# Patient Record
Sex: FEMALE | Race: WHITE | NOT HISPANIC OR LATINO | Employment: FULL TIME | RURAL
[De-identification: names, ages, dates, MRNs, and addresses within clinical notes are randomized per-mention and may not be internally consistent; named-entity substitution may affect disease eponyms.]

---

## 2018-08-15 ENCOUNTER — OFFICE VISIT (OUTPATIENT)
Dept: FAMILY MEDICINE CLINIC | Facility: CLINIC | Age: 38
End: 2018-08-15

## 2018-08-15 VITALS
DIASTOLIC BLOOD PRESSURE: 80 MMHG | BODY MASS INDEX: 33.4 KG/M2 | HEART RATE: 107 BPM | HEIGHT: 66 IN | RESPIRATION RATE: 20 BRPM | WEIGHT: 207.8 LBS | SYSTOLIC BLOOD PRESSURE: 120 MMHG | TEMPERATURE: 98.3 F | OXYGEN SATURATION: 97 %

## 2018-08-15 DIAGNOSIS — Z13.1 SCREENING FOR DIABETES MELLITUS: ICD-10-CM

## 2018-08-15 DIAGNOSIS — M25.511 CHRONIC RIGHT SHOULDER PAIN: Primary | ICD-10-CM

## 2018-08-15 DIAGNOSIS — G89.29 CHRONIC RIGHT SHOULDER PAIN: Primary | ICD-10-CM

## 2018-08-15 DIAGNOSIS — M79.672 LEFT FOOT PAIN: ICD-10-CM

## 2018-08-15 DIAGNOSIS — Z13.29 SCREENING FOR THYROID DISORDER: ICD-10-CM

## 2018-08-15 DIAGNOSIS — Z13.220 SCREENING FOR LIPOID DISORDERS: ICD-10-CM

## 2018-08-15 PROCEDURE — 99214 OFFICE O/P EST MOD 30 MIN: CPT | Performed by: NURSE PRACTITIONER

## 2018-08-15 RX ORDER — TETRACYCLINE HCL 500 MG
1 CAPSULE ORAL 3 TIMES DAILY
COMMUNITY
End: 2020-10-28

## 2018-08-15 NOTE — PATIENT INSTRUCTIONS
Preventive Care 18-39 Years, Female  Preventive care refers to lifestyle choices and visits with your health care provider that can promote health and wellness.  What does preventive care include?  · A yearly physical exam. This is also called an annual well check.  · Dental exams once or twice a year.  · Routine eye exams. Ask your health care provider how often you should have your eyes checked.  · Personal lifestyle choices, including:  ? Daily care of your teeth and gums.  ? Regular physical activity.  ? Eating a healthy diet.  ? Avoiding tobacco and drug use.  ? Limiting alcohol use.  ? Practicing safe sex.  ? Taking vitamin and mineral supplements as recommended by your health care provider.  What happens during an annual well check?  The services and screenings done by your health care provider during your annual well check will depend on your age, overall health, lifestyle risk factors, and family history of disease.  Counseling  Your health care provider may ask you questions about your:  · Alcohol use.  · Tobacco use.  · Drug use.  · Emotional well-being.  · Home and relationship well-being.  · Sexual activity.  · Eating habits.  · Work and work environment.  · Method of birth control.  · Menstrual cycle.  · Pregnancy history.    Screening  You may have the following tests or measurements:  · Height, weight, and BMI.  · Diabetes screening. This is done by checking your blood sugar (glucose) after you have not eaten for a while (fasting).  · Blood pressure.  · Lipid and cholesterol levels. These may be checked every 5 years starting at age 20.  · Skin check.  · Hepatitis C blood test.  · Hepatitis B blood test.  · Sexually transmitted disease (STD) testing.  · BRCA-related cancer screening. This may be done if you have a family history of breast, ovarian, tubal, or peritoneal cancers.  · Pelvic exam and Pap test. This may be done every 3 years starting at age 21. Starting at age 30, this may be done every 5  years if you have a Pap test in combination with an HPV test.    Discuss your test results, treatment options, and if necessary, the need for more tests with your health care provider.  Vaccines  Your health care provider may recommend certain vaccines, such as:  · Influenza vaccine. This is recommended every year.  · Tetanus, diphtheria, and acellular pertussis (Tdap, Td) vaccine. You may need a Td booster every 10 years.  · Varicella vaccine. You may need this if you have not been vaccinated.  · HPV vaccine. If you are 26 or younger, you may need three doses over 6 months.  · Measles, mumps, and rubella (MMR) vaccine. You may need at least one dose of MMR. You may also need a second dose.  · Pneumococcal 13-valent conjugate (PCV13) vaccine. You may need this if you have certain conditions and were not previously vaccinated.  · Pneumococcal polysaccharide (PPSV23) vaccine. You may need one or two doses if you smoke cigarettes or if you have certain conditions.  · Meningococcal vaccine. One dose is recommended if you are age 19-21 years and a first-year college student living in a [place], or if you have one of several medical conditions. You may also need additional booster doses.  · Hepatitis A vaccine. You may need this if you have certain conditions or if you travel or work in places where you may be exposed to hepatitis A.  · Hepatitis B vaccine. You may need this if you have certain conditions or if you travel or work in places where you may be exposed to hepatitis B.  · Haemophilus influenzae type b (Hib) vaccine. You may need this if you have certain risk factors.    Talk to your health care provider about which screenings and vaccines you need and how often you need them.  This information is not intended to replace advice given to you by your health care provider. Make sure you discuss any questions you have with your health care provider.  Document Released: 02/13/2003 Document Revised: 09/06/2017  Document Reviewed: 10/18/2016  [employer] Interactive Patient Education © 2018 [employer]  Foot Pain  Many things can cause foot pain. Some common causes are:  · An injury.  · A sprain.  · Arthritis.  · Blisters.  · Bunions.    Follow these instructions at home:  Pay attention to any changes in your symptoms. Take these actions to help with your discomfort:  · If directed, put ice on the affected area:  ? Put ice in a plastic bag.  ? Place a towel between your skin and the bag.  ? Leave the ice on for 15-20 minutes, 3?4 times a day for 2 days.  · Take over-the-counter and prescription medicines only as told by your health care provider.  · Wear comfortable, supportive shoes that fit you well. Do not wear high heels.  · Do not stand or walk for long periods of time.  · Do not lift a lot of weight. This can put added pressure on your feet.  · Do stretches to relieve foot pain and stiffness as told by your health care provider.  · Rub your foot gently.  · Keep your feet clean and dry.    Contact a health care provider if:  · Your pain does not get better after a few days of self-care.  · Your pain gets worse.  · You cannot stand on your foot.  Get help right away if:  · Your foot is numb or tingling.  · Your foot or toes are swollen.  · Your foot or toes turn white or blue.  · You have warmth and redness along your foot.  This information is not intended to replace advice given to you by your health care provider. Make sure you discuss any questions you have with your health care provider.  Document Released: 01/13/2017 Document Revised: 05/25/2017 Document Reviewed: 01/13/2016  [employer] Interactive Patient Education © 2018 [employer]  Shoulder Exercises  Ask your health care provider which exercises are safe for you. Do exercises exactly as told by your health care provider and adjust them as directed. It is normal to feel mild stretching, pulling, tightness, or discomfort as you do these exercises, but you should  stop right away if you feel sudden pain or your pain gets worse. Do not begin these exercises until told by your health care provider.  RANGE OF MOTION EXERCISES  These exercises warm up your muscles and joints and improve the movement and flexibility of your shoulder. These exercises also help to relieve pain, numbness, and tingling. These exercises involve stretching your injured shoulder directly.  Exercise A: Pendulum    1. Stand near a wall or a surface that you can hold onto for balance.  2. Bend at the waist and let your left / right arm hang straight down. Use your other arm to support you. Keep your back straight and do not lock your knees.  3. Relax your left / right arm and shoulder muscles, and move your hips and your trunk so your left / right arm swings freely. Your arm should swing because of the motion of your body, not because you are using your arm or shoulder muscles.  4. Keep moving your body so your arm swings in the following directions, as told by your health care provider:  ? Side to side.  ? Forward and backward.  ? In clockwise and counterclockwise circles.  5. Continue each motion for __________ seconds, or for as long as told by your health care provider.  6. Slowly return to the starting position.  Repeat __________ times. Complete this exercise __________ times a day.  Exercise B: Flexion, Standing    1. Stand and hold a broomstick, a cane, or a similar object. Place your hands a little more than shoulder-width apart on the object. Your left / right hand should be palm-up, and your other hand should be palm-down.  2. Keep your elbow straight and keep your shoulder muscles relaxed. Push the stick down with your healthy arm to raise your left / right arm in front of your body, and then over your head until you feel a stretch in your shoulder.  ? Avoid shrugging your shoulder while you raise your arm. Keep your shoulder blade tucked down toward the middle of your back.  3. Hold for  __________ seconds.  4. Slowly return to the starting position.  Repeat __________ times. Complete this exercise __________ times a day.  Exercise C: Abduction, Standing  1. Stand and hold a broomstick, a cane, or a similar object. Place your hands a little more than shoulder-width apart on the object. Your left / right hand should be palm-up, and your other hand should be palm-down.  2. While keeping your elbow straight and your shoulder muscles relaxed, push the stick across your body toward your left / right side. Raise your left / right arm to the side of your body and then over your head until you feel a stretch in your shoulder.  ? Do not raise your arm above shoulder height, unless your health care provider tells you to do that.  ? Avoid shrugging your shoulder while you raise your arm. Keep your shoulder blade tucked down toward the middle of your back.  3. Hold for __________ seconds.  4. Slowly return to the starting position.  Repeat __________ times. Complete this exercise __________ times a day.  Exercise D: Internal Rotation    1. Place your left / right hand behind your back, palm-up.  2. Use your other hand to dangle an exercise band, a towel, or a similar object over your shoulder. Grasp the band with your left / right hand so you are holding onto both ends.  3. Gently pull up on the band until you feel a stretch in the front of your left / right shoulder.  ? Avoid shrugging your shoulder while you raise your arm. Keep your shoulder blade tucked down toward the middle of your back.  4. Hold for __________ seconds.  5. Release the stretch by letting go of the band and lowering your hands.  Repeat __________ times. Complete this exercise __________ times a day.  STRETCHING EXERCISES  These exercises warm up your muscles and joints and improve the movement and flexibility of your shoulder. These exercises also help to relieve pain, numbness, and tingling. These exercises are done using your healthy  shoulder to help stretch the muscles of your injured shoulder.  Exercise E: Corner Stretch (External Rotation and Abduction)    1.  a doorway with one of your feet slightly in front of the other. This is called a staggered stance. If you cannot reach your forearms to the door frame, stand facing a corner of a room.  2. Choose one of the following positions as told by your health care provider:  ? Place your hands and forearms on the door frame above your head.  ? Place your hands and forearms on the door frame at the height of your head.  ? Place your hands on the door frame at the height of your elbows.  3. Slowly move your weight onto your front foot until you feel a stretch across your chest and in the front of your shoulders. Keep your head and chest upright and keep your abdominal muscles tight.  4. Hold for __________ seconds.  5. To release the stretch, shift your weight to your back foot.  Repeat __________ times. Complete this stretch __________ times a day.  Exercise F: Extension, Standing  1. Stand and hold a broomstick, a cane, or a similar object behind your back.  ? Your hands should be a little wider than shoulder-width apart.  ? Your palms should face away from your back.  2. Keeping your elbows straight and keeping your shoulder muscles relaxed, move the stick away from your body until you feel a stretch in your shoulder.  ? Avoid shrugging your shoulders while you move the stick. Keep your shoulder blade tucked down toward the middle of your back.  3. Hold for __________ seconds.  4. Slowly return to the starting position.  Repeat __________ times. Complete this exercise __________ times a day.  STRENGTHENING EXERCISES  These exercises build strength and endurance in your shoulder. Endurance is the ability to use your muscles for a long time, even after they get tired.  Exercise G: External Rotation    1. Sit in a stable chair without armrests.  2. Secure an exercise band at elbow height on  your left / right side.  3. Place a soft object, such as a folded towel or a small pillow, between your left / right upper arm and your body to move your elbow a few inches away (about 10 cm) from your side.  4. Hold the end of the band so it is tight and there is no slack.  5. Keeping your elbow pressed against the soft object, move your left / right forearm out, away from your abdomen. Keep your body steady so only your forearm moves.  6. Hold for __________ seconds.  7. Slowly return to the starting position.  Repeat __________ times. Complete this exercise __________ times a day.  Exercise H: Shoulder Abduction    1. Sit in a stable chair without armrests, or stand.  2. Hold a __________ weight in your left / right hand, or hold an exercise band with both hands.  3. Start with your arms straight down and your left / right palm facing in, toward your body.  4. Slowly lift your left / right hand out to your side. Do not lift your hand above shoulder height unless your health care provider tells you that this is safe.  ? Keep your arms straight.  ? Avoid shrugging your shoulder while you do this movement. Keep your shoulder blade tucked down toward the middle of your back.  5. Hold for __________ seconds.  6. Slowly lower your arm, and return to the starting position.  Repeat __________ times. Complete this exercise __________ times a day.  Exercise I: Shoulder Extension  1. Sit in a stable chair without armrests, or stand.  2. Secure an exercise band to a stable object in front of you where it is at shoulder height.  3. Hold one end of the exercise band in each hand. Your palms should face each other.  4. Straighten your elbows and lift your hands up to shoulder height.  5. Step back, away from the secured end of the exercise band, until the band is tight and there is no slack.  6. Squeeze your shoulder blades together as you pull your hands down to the sides of your thighs. Stop when your hands are straight down  "by your sides. Do not let your hands go behind your body.  7. Hold for __________ seconds.  8. Slowly return to the starting position.  Repeat __________ times. Complete this exercise __________ times a day.  Exercise J: Standing Shoulder Row  1. Sit in a stable chair without armrests, or stand.  2. Secure an exercise band to a stable object in front of you so it is at waist height.  3. Hold one end of the exercise band in each hand. Your palms should be in a thumbs-up position.  4. Bend each of your elbows to an \"L\" shape (about 90 degrees) and keep your upper arms at your sides.  5. Step back until the band is tight and there is no slack.  6. Slowly pull your elbows back behind you.  7. Hold for __________ seconds.  8. Slowly return to the starting position.  Repeat __________ times. Complete this exercise __________ times a day.  Exercise K: Shoulder Press-Ups    1. Sit in a stable chair that has armrests. Sit upright, with your feet flat on the floor.  2. Put your hands on the armrests so your elbows are bent and your fingers are pointing forward. Your hands should be about even with the sides of your body.  3. Push down on the armrests and use your arms to lift yourself off of the chair. Straighten your elbows and lift yourself up as much as you comfortably can.  ? Move your shoulder blades down, and avoid letting your shoulders move up toward your ears.  ? Keep your feet on the ground. As you get stronger, your feet should support less of your body weight as you lift yourself up.  4. Hold for __________ seconds.  5. Slowly lower yourself back into the chair.  Repeat __________ times. Complete this exercise __________ times a day.  Exercise L: Wall Push-Ups    1. Stand so you are facing a stable wall. Your feet should be about one arm-length away from the wall.  2. Lean forward and place your palms on the wall at shoulder height.  3. Keep your feet flat on the floor as you bend your elbows and lean forward " toward the wall.  4. Hold for __________ seconds.  5. Straighten your elbows to push yourself back to the starting position.  Repeat __________ times. Complete this exercise __________ times a day.  This information is not intended to replace advice given to you by your health care provider. Make sure you discuss any questions you have with your health care provider.  Document Released: 11/01/2006 Document Revised: 09/11/2017 Document Reviewed: 08/28/2016  [employer] Interactive Patient Education © 2018 [employer]

## 2018-08-16 NOTE — PROGRESS NOTES
Hallux valgus deformity of the great toe is noted on the left foot.  Plantar calcaneal spur noted.  Would she like a referral to podiatry?

## 2018-08-17 ENCOUNTER — TELEPHONE (OUTPATIENT)
Dept: FAMILY MEDICINE CLINIC | Facility: CLINIC | Age: 38
End: 2018-08-17

## 2018-08-17 DIAGNOSIS — M20.12 HALLUX VALGUS OF LEFT FOOT: ICD-10-CM

## 2018-08-17 DIAGNOSIS — M77.32 CALCANEAL SPUR OF LEFT FOOT: Primary | ICD-10-CM

## 2018-08-17 NOTE — TELEPHONE ENCOUNTER
----- Message from DELPHINE Flores sent at 8/16/2018 10:51 AM CDT -----  Hallux valgus deformity of the great toe is noted on the left foot.  Plantar calcaneal spur noted.  Would she like a referral to podiatry?

## 2018-08-23 LAB
DEPRECATED RDW RBC AUTO: 45.5 FL (ref 36.4–46.3)
ERYTHROCYTE [DISTWIDTH] IN BLOOD BY AUTOMATED COUNT: 14.2 % (ref 11.5–14.5)
HCT VFR BLD AUTO: 44 % (ref 35–45)
HGB BLD-MCNC: 14.7 G/DL (ref 12–15.5)
MCH RBC QN AUTO: 28.9 PG (ref 26.5–34)
MCHC RBC AUTO-ENTMCNC: 33.4 G/DL (ref 31.4–36)
MCV RBC AUTO: 86.6 FL (ref 80–98)
PLATELET # BLD AUTO: 288 10*3/MM3 (ref 150–450)
PMV BLD AUTO: 12.3 FL (ref 8–12)
RBC # BLD AUTO: 5.08 10*6/MM3 (ref 3.77–5.16)
WBC NRBC COR # BLD: 7.79 10*3/MM3 (ref 3.2–9.8)

## 2018-08-23 PROCEDURE — 83036 HEMOGLOBIN GLYCOSYLATED A1C: CPT | Performed by: NURSE PRACTITIONER

## 2018-08-23 PROCEDURE — 85027 COMPLETE CBC AUTOMATED: CPT | Performed by: NURSE PRACTITIONER

## 2018-08-23 PROCEDURE — 80061 LIPID PANEL: CPT | Performed by: NURSE PRACTITIONER

## 2018-08-23 PROCEDURE — 82306 VITAMIN D 25 HYDROXY: CPT | Performed by: NURSE PRACTITIONER

## 2018-08-23 PROCEDURE — 84443 ASSAY THYROID STIM HORMONE: CPT | Performed by: NURSE PRACTITIONER

## 2018-08-23 PROCEDURE — 36415 COLL VENOUS BLD VENIPUNCTURE: CPT | Performed by: NURSE PRACTITIONER

## 2018-08-23 PROCEDURE — 80053 COMPREHEN METABOLIC PANEL: CPT | Performed by: NURSE PRACTITIONER

## 2018-08-24 LAB
25(OH)D3 SERPL-MCNC: 18.1 NG/ML (ref 30–100)
ALBUMIN SERPL-MCNC: 4.1 G/DL (ref 3.4–4.8)
ALBUMIN/GLOB SERPL: 1.1 G/DL (ref 1.1–1.8)
ALP SERPL-CCNC: 40 U/L (ref 38–126)
ALT SERPL W P-5'-P-CCNC: 38 U/L (ref 9–52)
ANION GAP SERPL CALCULATED.3IONS-SCNC: 11 MMOL/L (ref 5–15)
ARTICHOKE IGE QN: 106 MG/DL (ref 1–129)
AST SERPL-CCNC: 28 U/L (ref 14–36)
BILIRUB SERPL-MCNC: 0.5 MG/DL (ref 0.2–1.3)
BUN BLD-MCNC: 9 MG/DL (ref 7–21)
BUN/CREAT SERPL: 13 (ref 7–25)
CALCIUM SPEC-SCNC: 9.6 MG/DL (ref 8.4–10.2)
CHLORIDE SERPL-SCNC: 102 MMOL/L (ref 95–110)
CHOLEST SERPL-MCNC: 182 MG/DL (ref 0–199)
CO2 SERPL-SCNC: 25 MMOL/L (ref 22–31)
CREAT BLD-MCNC: 0.69 MG/DL (ref 0.5–1)
GFR SERPL CREATININE-BSD FRML MDRD: 95 ML/MIN/1.73 (ref 64–149)
GLOBULIN UR ELPH-MCNC: 3.6 GM/DL (ref 2.3–3.5)
GLUCOSE BLD-MCNC: 91 MG/DL (ref 60–100)
HBA1C MFR BLD: 5.4 % (ref 4–5.6)
HDLC SERPL-MCNC: 49 MG/DL (ref 60–200)
LDLC/HDLC SERPL: 2.42 {RATIO} (ref 0–3.22)
POTASSIUM BLD-SCNC: 4.6 MMOL/L (ref 3.5–5.1)
PROT SERPL-MCNC: 7.7 G/DL (ref 6.3–8.6)
SODIUM BLD-SCNC: 138 MMOL/L (ref 137–145)
TRIGL SERPL-MCNC: 71 MG/DL (ref 20–199)
TSH SERPL DL<=0.05 MIU/L-ACNC: 1.79 MIU/ML (ref 0.46–4.68)

## 2018-08-28 NOTE — PROGRESS NOTES
Subjective   Sara Edwards is a 37 y.o. female.     She presents today for problems that she has been having with right shoulder and left foot pain.  Reports decreased ROM in both.  These are both new problems.  No known injury, she just developed pain gradually.  She is due for routine fasting annual labs.  She has otherwise been doing well since the last time we saw her.  No other new complaints today in the office.      Arm Pain    The incident occurred more than 1 week ago. There was no injury mechanism. The pain is present in the right shoulder. The quality of the pain is described as aching and burning. The pain has been intermittent since the incident. Pertinent negatives include no chest pain, muscle weakness, numbness or tingling. The symptoms are aggravated by movement. The treatment provided no relief.   Lower Extremity Issue   This is a new problem. The current episode started more than 1 month ago. The problem occurs intermittently. The problem has been waxing and waning. Associated symptoms include arthralgias (pain in the right shoulder and left foot). Pertinent negatives include no abdominal pain, anorexia, change in bowel habit, chest pain, chills, congestion, coughing, diaphoresis, fatigue, fever, headaches, joint swelling, myalgias, nausea, neck pain, numbness, rash, sore throat, swollen glands, urinary symptoms, vertigo, visual change, vomiting or weakness. The symptoms are aggravated by walking and standing. The treatment provided no relief.        The following portions of the patient's history were reviewed and updated as appropriate: allergies, current medications, past family history, past medical history, past social history, past surgical history and problem list.    Review of Systems   Constitutional: Negative.  Negative for chills, diaphoresis, fatigue and fever.   HENT: Negative.  Negative for congestion and sore throat.    Eyes: Negative.    Respiratory: Negative.  Negative for  cough.    Cardiovascular: Negative.  Negative for chest pain.   Gastrointestinal: Negative.  Negative for abdominal pain, anorexia, change in bowel habit, nausea and vomiting.   Musculoskeletal: Positive for arthralgias (pain in the right shoulder and left foot). Negative for joint swelling, myalgias and neck pain.   Skin: Negative.  Negative for rash.   Allergic/Immunologic: Negative.    Neurological: Negative.  Negative for vertigo, tingling, weakness and numbness.   Hematological: Negative.    Psychiatric/Behavioral: Negative.        Objective   Physical Exam   Constitutional: She is oriented to person, place, and time. Vital signs are normal. She appears well-developed and well-nourished. No distress.   HENT:   Head: Normocephalic.   Right Ear: External ear normal.   Left Ear: External ear normal.   Nose: Nose normal.   Mouth/Throat: Oropharynx is clear and moist. No oropharyngeal exudate.   Eyes: Pupils are equal, round, and reactive to light. Conjunctivae and EOM are normal. Right eye exhibits no discharge. Left eye exhibits no discharge.   Neck: Normal range of motion. Neck supple. No tracheal deviation present. No thyromegaly present.   Cardiovascular: Normal rate, regular rhythm and normal heart sounds.  Exam reveals no gallop and no friction rub.    No murmur heard.  Pulmonary/Chest: Effort normal and breath sounds normal. No respiratory distress. She has no wheezes. She has no rales. She exhibits no tenderness.   Musculoskeletal:        Right shoulder: She exhibits pain.        Left foot: There is decreased range of motion and tenderness.   Lymphadenopathy:     She has no cervical adenopathy.   Neurological: She is alert and oriented to person, place, and time.   Skin: Skin is warm and dry. Capillary refill takes less than 2 seconds. No rash noted. She is not diaphoretic. No erythema. No pallor.   Psychiatric: She has a normal mood and affect. Her behavior is normal. Judgment and thought content normal.    Nursing note and vitals reviewed.        Assessment/Plan   Sara was seen today for pain.    Diagnoses and all orders for this visit:    Chronic right shoulder pain  -     XR Shoulder 2+ View Right  -     CBC (No Diff)  -     Comprehensive Metabolic Panel  -     Vitamin D 25 Hydroxy    Left foot pain  -     XR Foot 3+ View Left  -     Vitamin D 25 Hydroxy    Screening for diabetes mellitus  -     Hemoglobin A1c    Screening for lipoid disorders  -     Lipid Panel    Screening for thyroid disorder  -     TSH    Patient's Body mass index is 34.05 kg/m². BMI is above normal parameters. Recommendations include: educational material, exercise counseling and nutrition counseling.  X-ray following office visit.  Fasting labs.  Continue all current medications.  Follow up in 4 weeks for routine follow up.  Follow up sooner for problems/concerns.  Patient verbalized understanding and agreement with plan of care.        This document has been electronically signed by DELPHINE Flores on August 28, 2018 2:47 PM

## 2018-09-04 ENCOUNTER — TELEPHONE (OUTPATIENT)
Dept: FAMILY MEDICINE CLINIC | Facility: CLINIC | Age: 38
End: 2018-09-04

## 2018-09-04 NOTE — TELEPHONE ENCOUNTER
----- Message from DELPHINE lFores sent at 8/24/2018  2:59 PM CDT -----  Vitamin D is low.  Recommend an OTC vitamin D supplement once daily.

## 2018-09-07 DIAGNOSIS — IMO0002 CHRONIC MIGRAINE: Primary | ICD-10-CM

## 2018-09-07 RX ORDER — RIZATRIPTAN BENZOATE 10 MG/1
10 TABLET, ORALLY DISINTEGRATING ORAL ONCE AS NEEDED
Qty: 9 TABLET | Refills: 2 | Status: SHIPPED | OUTPATIENT
Start: 2018-09-07 | End: 2018-09-13 | Stop reason: SDUPTHER

## 2018-09-13 ENCOUNTER — OFFICE VISIT (OUTPATIENT)
Dept: FAMILY MEDICINE CLINIC | Facility: CLINIC | Age: 38
End: 2018-09-13

## 2018-09-13 VITALS
HEIGHT: 66 IN | OXYGEN SATURATION: 98 % | DIASTOLIC BLOOD PRESSURE: 70 MMHG | TEMPERATURE: 98.9 F | HEART RATE: 84 BPM | BODY MASS INDEX: 31.77 KG/M2 | RESPIRATION RATE: 20 BRPM | SYSTOLIC BLOOD PRESSURE: 120 MMHG | WEIGHT: 197.7 LBS

## 2018-09-13 DIAGNOSIS — IMO0002 CHRONIC MIGRAINE: ICD-10-CM

## 2018-09-13 DIAGNOSIS — Z01.419 ENCOUNTER FOR GYNECOLOGICAL EXAMINATION: Primary | ICD-10-CM

## 2018-09-13 DIAGNOSIS — E55.9 VITAMIN D DEFICIENCY: ICD-10-CM

## 2018-09-13 PROCEDURE — 99395 PREV VISIT EST AGE 18-39: CPT | Performed by: NURSE PRACTITIONER

## 2018-09-13 PROCEDURE — G0123 SCREEN CERV/VAG THIN LAYER: HCPCS | Performed by: NURSE PRACTITIONER

## 2018-09-13 RX ORDER — RIZATRIPTAN BENZOATE 10 MG/1
10 TABLET, ORALLY DISINTEGRATING ORAL ONCE AS NEEDED
Qty: 9 TABLET | Refills: 2 | Status: SHIPPED | OUTPATIENT
Start: 2018-09-13 | End: 2020-10-28

## 2018-09-13 RX ORDER — CHOLECALCIFEROL (VITAMIN D3) 50 MCG
2000 TABLET ORAL DAILY
Qty: 30 TABLET | Refills: 11 | Status: SHIPPED | OUTPATIENT
Start: 2018-09-13 | End: 2019-10-24 | Stop reason: SDUPTHER

## 2018-09-13 NOTE — PATIENT INSTRUCTIONS
Preventive Care 18-39 Years, Female  Preventive care refers to lifestyle choices and visits with your health care provider that can promote health and wellness.  What does preventive care include?  · A yearly physical exam. This is also called an annual well check.  · Dental exams once or twice a year.  · Routine eye exams. Ask your health care provider how often you should have your eyes checked.  · Personal lifestyle choices, including:  ? Daily care of your teeth and gums.  ? Regular physical activity.  ? Eating a healthy diet.  ? Avoiding tobacco and drug use.  ? Limiting alcohol use.  ? Practicing safe sex.  ? Taking vitamin and mineral supplements as recommended by your health care provider.  What happens during an annual well check?  The services and screenings done by your health care provider during your annual well check will depend on your age, overall health, lifestyle risk factors, and family history of disease.  Counseling  Your health care provider may ask you questions about your:  · Alcohol use.  · Tobacco use.  · Drug use.  · Emotional well-being.  · Home and relationship well-being.  · Sexual activity.  · Eating habits.  · Work and work environment.  · Method of birth control.  · Menstrual cycle.  · Pregnancy history.    Screening  You may have the following tests or measurements:  · Height, weight, and BMI.  · Diabetes screening. This is done by checking your blood sugar (glucose) after you have not eaten for a while (fasting).  · Blood pressure.  · Lipid and cholesterol levels. These may be checked every 5 years starting at age 20.  · Skin check.  · Hepatitis C blood test.  · Hepatitis B blood test.  · Sexually transmitted disease (STD) testing.  · BRCA-related cancer screening. This may be done if you have a family history of breast, ovarian, tubal, or peritoneal cancers.  · Pelvic exam and Pap test. This may be done every 3 years starting at age 21. Starting at age 30, this may be done every 5  years if you have a Pap test in combination with an HPV test.    Discuss your test results, treatment options, and if necessary, the need for more tests with your health care provider.  Vaccines  Your health care provider may recommend certain vaccines, such as:  · Influenza vaccine. This is recommended every year.  · Tetanus, diphtheria, and acellular pertussis (Tdap, Td) vaccine. You may need a Td booster every 10 years.  · Varicella vaccine. You may need this if you have not been vaccinated.  · HPV vaccine. If you are 26 or younger, you may need three doses over 6 months.  · Measles, mumps, and rubella (MMR) vaccine. You may need at least one dose of MMR. You may also need a second dose.  · Pneumococcal 13-valent conjugate (PCV13) vaccine. You may need this if you have certain conditions and were not previously vaccinated.  · Pneumococcal polysaccharide (PPSV23) vaccine. You may need one or two doses if you smoke cigarettes or if you have certain conditions.  · Meningococcal vaccine. One dose is recommended if you are age 19-21 years and a first-year college student living in a [place], or if you have one of several medical conditions. You may also need additional booster doses.  · Hepatitis A vaccine. You may need this if you have certain conditions or if you travel or work in places where you may be exposed to hepatitis A.  · Hepatitis B vaccine. You may need this if you have certain conditions or if you travel or work in places where you may be exposed to hepatitis B.  · Haemophilus influenzae type b (Hib) vaccine. You may need this if you have certain risk factors.    Talk to your health care provider about which screenings and vaccines you need and how often you need them.  This information is not intended to replace advice given to you by your health care provider. Make sure you discuss any questions you have with your health care provider.  Document Released: 02/13/2003 Document Revised: 09/06/2017  Document Reviewed: 10/18/2016  [employer] Interactive Patient Education © 2018 [employer]

## 2018-09-18 LAB
LAB AP CASE REPORT: NORMAL
LAB AP GYN ADDITIONAL INFORMATION: NORMAL
LAB AP GYN OTHER FINDINGS: NORMAL
PATH INTERP SPEC-IMP: NORMAL
STAT OF ADQ CVX/VAG CYTO-IMP: NORMAL

## 2018-09-20 ENCOUNTER — TELEPHONE (OUTPATIENT)
Dept: FAMILY MEDICINE CLINIC | Facility: CLINIC | Age: 38
End: 2018-09-20

## 2018-10-19 ENCOUNTER — OFFICE VISIT (OUTPATIENT)
Dept: PODIATRY | Facility: CLINIC | Age: 38
End: 2018-10-19

## 2018-10-19 VITALS — HEIGHT: 66 IN | HEART RATE: 85 BPM | WEIGHT: 190 LBS | BODY MASS INDEX: 30.53 KG/M2 | OXYGEN SATURATION: 99 %

## 2018-10-19 DIAGNOSIS — M79.672 FOOT PAIN, BILATERAL: Primary | ICD-10-CM

## 2018-10-19 DIAGNOSIS — M72.2 PLANTAR FASCIITIS: ICD-10-CM

## 2018-10-19 DIAGNOSIS — M67.472 GANGLION OF FOOT, LEFT: ICD-10-CM

## 2018-10-19 DIAGNOSIS — M79.671 FOOT PAIN, BILATERAL: Primary | ICD-10-CM

## 2018-10-19 PROCEDURE — 99203 OFFICE O/P NEW LOW 30 MIN: CPT | Performed by: PODIATRIST

## 2018-10-19 RX ORDER — MELOXICAM 15 MG/1
15 TABLET ORAL DAILY
Qty: 30 TABLET | Refills: 1 | Status: SHIPPED | OUTPATIENT
Start: 2018-10-19 | End: 2019-02-04

## 2018-10-19 NOTE — PROGRESS NOTES
Sara Edwards  1980  37 y.o. female     Patient presents today with complaint of left foot pain. She states her pain is 1/10 today.     10/19/2018    Chief Complaint   Patient presents with   • Left Foot - Pain       History of Present Illness    Sara Edwards is a 37 y.o.female who presents to clinic with chief complaint of left foot pain.  Pain is located to the bottom of her heel.  Pain is present for approximately 3 months.  She describes a sharp and worse with the first step in the morning.  She rates as a 1 out of 10.  She has a second complaint today of the not on the top of her left foot.  She noticed a knot for the first time proximally 2 months ago.  The swelling and the knot comes and goes.  It is not currently swollen.  There is pain with direct pressure to the area.  She denies any injuries or trauma.  She has no other pedal complaints.      Past Medical History:   Diagnosis Date   • Callus    • Chronic right shoulder pain    • Ingrown toenail    • Obesity          Past Surgical History:   Procedure Laterality Date   • KNEE SURGERY Right          Family History   Problem Relation Age of Onset   • Heart disease Mother    • Cancer Mother    • Diabetes Father    • Hypertension Father    • Hyperlipidemia Father    • Heart disease Maternal Grandmother        Allergies   Allergen Reactions   • Penicillins Anaphylaxis   • Sulfa Antibiotics Other (See Comments)     Blister all over body       Social History     Social History   • Marital status:      Spouse name: N/A   • Number of children: N/A   • Years of education: N/A     Occupational History   • Not on file.     Social History Main Topics   • Smoking status: Former Smoker   • Smokeless tobacco: Never Used   • Alcohol use Yes      Comment: Ocassionally   • Drug use: No   • Sexual activity: Yes     Other Topics Concern   • Not on file     Social History Narrative   • No narrative on file         Current Outpatient Prescriptions  "  Medication Sig Dispense Refill   • Apple Cider Vinegar 500 MG tablet Take 1 tablet by mouth 3 (Three) Times a Day.     • Calcium Polycarbophil (FIBER-CAPS PO) Take 1 syringe by mouth 2 (Two) Times a Day.     • Cholecalciferol (VITAMIN D) 2000 units tablet Take 2,000 Units by mouth Daily. 30 tablet 11   • Cinnamon 500 MG tablet Take 2 tablets by mouth Daily.     • COLLAGEN PO Take 1 tablet by mouth 3 (Three) Times a Day.     • MAGNESIUM PO Take 1 tablet by mouth Daily.     • Potassium 99 MG tablet Take 1 tablet by mouth Daily.     • rizatriptan MLT (MAXALT-MLT) 10 MG disintegrating tablet Take 1 tablet by mouth 1 (One) Time As Needed for Migraine for up to 1 dose. May repeat in 2 hours if needed 9 tablet 2   • SUPER B COMPLEX/C PO Take 1 tablet by mouth Every Other Day.     • meloxicam (MOBIC) 15 MG tablet Take 1 tablet by mouth Daily. 30 tablet 1     No current facility-administered medications for this visit.        Review of Systems   Constitutional: Negative.    Eyes: Negative.    Respiratory: Negative.    Cardiovascular: Negative.    Gastrointestinal: Negative.    Musculoskeletal: Positive for arthralgias.        Left foot pain    Skin: Negative.    Neurological: Positive for headaches.   Psychiatric/Behavioral: Negative.          OBJECTIVE    Pulse 85   Ht 166.4 cm (65.51\")   Wt 86.2 kg (190 lb)   SpO2 99%   BMI 31.13 kg/m²       Physical Exam   Constitutional: She is oriented to person, place, and time. She appears well-developed and well-nourished. No distress.   HENT:   Head: Normocephalic and atraumatic.   Nose: Nose normal.   Eyes: Pupils are equal, round, and reactive to light. Conjunctivae and EOM are normal.   Pulmonary/Chest: Effort normal. No respiratory distress. She has no wheezes.   Neurological: She is alert and oriented to person, place, and time. She displays normal reflexes.   Skin: Skin is warm and dry. Capillary refill takes less than 2 seconds.   Psychiatric: She has a normal mood and " affect. Her behavior is normal.   Vitals reviewed.      Gait: normal     Assistive Device: none     Lower Extremity    Cardiovascular:    DP/PT pulses palpable bilateral  CFT brisk  to all digits  Skin temp is warm to warm from proximal tibia to distal digits bilateral  Pedal hair growth present.   No erythema or edema noted     Musculoskeletal:  Muscle strength is 5/5 for all muscle groups tested   ROM of the 1st MTP is full without pain or crepitus bilateral  ROM of the MTJ is full without pain or crepitus  bilateral  ROM of the STJ is full without pain or crepitus bilateral   ROM of the ankle joint is full without pain or crepitus  bilateral  Pain on palpation to the medial tubercle of the calcaneus.L>R. negative lateral squeeze test    Dermatological:   Skin is warm, dry and intact bilateral  Webspaces 1-4 bilateral are clean, dry and intact.   Small freely mobile subcutaneous nodule noted to the dorsal lateral left forefoot.  Slight tenderness to palpation.  No open wounds noted     Neurological:   Protective sensation intact   Sensation intact to light touch          Procedures        ASSESSMENT AND PLAN    Sara was seen today for pain.    Diagnoses and all orders for this visit:    Foot pain, bilateral    Plantar fasciitis    Ganglion of foot, left    Other orders  -     meloxicam (MOBIC) 15 MG tablet; Take 1 tablet by mouth Daily.      - Comprehensive foot and ankle exam performed  - X-rays taken and reviewed  - rx for mobic   - rx for custom orthotics   - Patient advised to stretch, ice and to make appropriate shoe gear changes to include wearing athletic type shoes with supportive insoles. Patient was given written instructions on how to correctly perform the stretching of the Achilles tendon/calf stretches, and the heel spur/plantar fasciitis regimen. Limit bare foot walking.    - Diagnosis, etiology and treatment of ganglionic cyst discussed in detail with the patient.  - Patient is in agreement  with the current treatment plan and all questions were answered.  - RTC in 4-6 weeks           This document has been electronically signed by Owen Villalobos DPM on October 19, 2018 11:55 AM     10/19/2018  11:55 AM

## 2018-10-25 ENCOUNTER — TRANSCRIBE ORDERS (OUTPATIENT)
Dept: PODIATRY | Facility: CLINIC | Age: 38
End: 2018-10-25

## 2018-10-25 DIAGNOSIS — M72.2 PLANTAR FASCIITIS: Primary | ICD-10-CM

## 2018-11-08 ENCOUNTER — HOSPITAL ENCOUNTER (OUTPATIENT)
Dept: PHYSICAL THERAPY | Facility: HOSPITAL | Age: 38
Setting detail: THERAPIES SERIES
Discharge: HOME | End: 2018-11-08
Attending: PODIATRIST

## 2018-11-08 DIAGNOSIS — M72.2 PLANTAR FASCIITIS: Primary | ICD-10-CM

## 2018-11-08 PROCEDURE — 97162 PT EVAL MOD COMPLEX 30 MIN: CPT | Performed by: PHYSICAL THERAPIST

## 2018-11-08 NOTE — THERAPY EVALUATION
Outpatient Physical Therapy Ortho Initial Evaluation  [facility]  Soha Min, PT, DPT, CSCS       Patient Name: Sara Edwards  : 1980  MRN: [id number]  Today's Date: 2018      Visit Date: 2018     Pt reports 1/10 pain pre treatment, 1/10 pain post treatment  Reports N/A% of improvement.  Attended  visits.  Insurance available: Orthotics approved  Next MD appt: 2 weeks after orthotics.  Recertification: N/A.    Patient Active Problem List   Diagnosis   • Chronic right shoulder pain   • Left foot pain   • Chronic migraine        Past Medical History:   Diagnosis Date   • Callus    • Chronic right shoulder pain    • Ingrown toenail    • Obesity         Past Surgical History:   Procedure Laterality Date   • CARPAL TUNNEL RELEASE Right 2015   • KNEE SURGERY Right        Visit Dx:     ICD-10-CM ICD-9-CM   1. Plantar fasciitis M72.2 728.71     Number of days off work: None    Patient is .    Patient has children.    Allergies       PenicillinsAnaphylaxis   Sulfa AntibioticsOther (See Comments)- blisters all over body     Sheldon as Reviewed Reviewed by You at 11:07 AM     Medications (Admitted on 2018)     Apple Cider Vinegar 500 MG tablet     Calcium Polycarbophil (FIBER-CAPS PO)     Cholecalciferol (VITAMIN D) 2000 units tablet     Cinnamon 500 MG tablet     COLLAGEN PO     MAGNESIUM PO     meloxicam (MOBIC) 15 MG tablet     Potassium 99 MG tablet     rizatriptan MLT (MAXALT-MLT) 10 MG disintegrating tablet     SUPER B COMPLEX/C PO                  Patient History     Row Name 18 1100             History    Chief Complaint Pain  -AJ      Type of Pain Foot pain  -AJ      Date Current Problem(s) Began --   Chronic  -AJ      Brief Description of Current Complaint patient rpeorts she has had pain since high school. She reprots that she has had issues witthem on/off for awhile. She reports that the last 4-5 months it has been increasing. Orthotics when  in high school which helped, but none for some time.  -AJ      Previous treatment for THIS PROBLEM Medication;Injections  -AJ      Patient/Caregiver Goals Relieve pain  -AJ      Current Tobacco Use None  -AJ      Smoking Status Former smoker  -AJ      Patient's Rating of General Health Good  -AJ      Occupation/sports/leisure activities Occupation: [employer]; Hobbies: relax, work out at [employer]  -AJ      Patient seeing anyone else for problem(s)? Yes, DPM  -AJ      What clinical tests have you had for this problem? X-ray  -AJ      Results of Clinical Tests FINDINGS: Hallux valgus deformity of the great toe is noted. Plantar calcaneal spur is noted.  -AJ      History of Previous Related Injuries L foot twisted stepping off a pallet back in early august.  -AJ      Are you or can you be pregnant No  -AJ         Pain     Pain Location Foot  -AJ      Pain at Present 1  -AJ      Pain at Best 0  -AJ      Pain at Worst 8  -AJ      Pain Frequency Intermittent  -AJ      Pain Description Penetrating  -AJ      What Performance Factors Make the Current Problem(s) WORSE? standing/walking  -AJ      What Performance Factors Make the Current Problem(s) BETTER? rest, getting off feet  -AJ      Tolerance Time- Standing 2-3 hours  -AJ      Tolerance Time- Sitting usually okay  -AJ      Tolerance Time- Walking 2-3 hours  -AJ      Is your sleep disturbed? Yes   sometimes  -AJ      Is medication used to assist with sleep? Yes   sometimes  -AJ      Difficulties at work? walking, standing  -AJ      Difficulties with ADL's? None  -AJ      Difficulties with recreational activities? working out.  -AJ        User Key  (r) = Recorded By, (t) = Taken By, (c) = Cosigned By    Initials Name Provider Type    AJ Soha Min, PT Physical Therapist                PT Ortho     Row Name 11/08/18 1100       Precautions and Contraindications    Precautions/Limitations no known precautions/limitations  -AJ       Subjective Pain    Able to  rate subjective pain? yes  -AJ    Pre-Treatment Pain Level 1  -AJ    Post-Treatment Pain Level 1  -       Posture/Observations    Alignment Options Foot pronation;Pes Planus  -AJ    Foot pronation Bilateral:;Mild;Moderate;Decreased   Supinates, R>L  -AJ    Pes Planus Bilateral:;Mild;Moderate;Decreased   B Pes avus  -    Posture/Observations Comments No acute distress, wearing B tennis shoes.  -AJ       Special Tests/Palpation    Special Tests/Palpation --   No areas of TTP.  -AJ       General ROM    GENERAL ROM COMMENTS AROm for B ankles and feet, WFL.  -AJ       MMT (Manual Muscle Testing)    General MMT Comments Strength for B ankles and feet, WFL.  -AJ       Sensation    Sensation WNL? WNL  -AJ    Light Touch No apparent deficits  -AJ    Additional Comments Denies any numbness or tingling.  -AJ       Pathomechanics    Lower Extremity Pathomechanics --   R>L foot supination with gait.  -AJ       Ankle Foot Orthosis Management    Type (Ankle/Foot Orthosis) bilateral;foot orthosis  -AJ    Fabrication Comment (Ankle Foot Orthosis) B slipper casts molded to be sent ot lab for fabrication according to MD specifications.  -AJ    Functional Design (Ankle Foot Orthosis) static orthosis  -AJ    Therapeutic Indications (Ankle Foot Orthosis) pain management;rest/inflammation reduction  -    Sensory Assessment (Ankle Foot Orthosis) Noneot note B feet/ankles  -    Skin Assessment (Ankle Foot Orthosis) Skin intact, no significant calleousing to note.  -       Transfers    Comment (Transfers) I with all transfers.  -AJ       Gait/Stairs Assessment/Training    Comment (Gait/Stairs) FWB, non-antalgic gait, wearing B tennis shoes, R>L supination.  -      User Key  (r) = Recorded By, (t) = Taken By, (c) = Cosigned By    Initials Name Provider Type    AJ Soha Min, PT Physical Therapist                      Therapy Education  Given: HEP, Symptoms/condition management, Pain management (POC; Shoewear  consultation)  Program: New  How Provided: Verbal  Provided to: Patient  Level of Understanding: Verbalized           PT OP Goals     Row Name 11/08/18 1100          PT Short Term Goals    STG Date to Achieve 12/07/18  -AJ     STG 1 Patientot be molded for B orthotics.  -AJ     STG 1 Progress Met  -AJ     STG 2 patient to show proper shoewear choices.  -AJ     STG 3 Patientot be instructed in use/care and verbalize understanding.  -AJ     STG 4 Patientot be instructed in orthotic wear schedule and verbalize understanding.  -AJ     STG 5 Patientot show understanding of skin checks and be able to verbalize.  -AJ        Long Term Goals    LTG Date to Achieve 02/02/18  -AJ     LTG 1 Patientot come in for orthotic adjustments prn.  -AJ        Time Calculation    PT Goal Re-Cert Due Date --   N/A  -       User Key  (r) = Recorded By, (t) = Taken By, (c) = Cosigned By    Initials Name Provider Type    Soha De La Fuente, PT Physical Therapist         Barriers to Rehab: Include significant or possible arthritic/degenerative changes that have occurred within the joints.    Safety Issues: None noted.            PT Assessment/Plan     Row Name 11/08/18 1100          PT Assessment    Functional Limitations Impaired gait;Impaired locomotion;Limitation in home management;Limitations in community activities;Performance in leisure activities;Performance in work activities  -     Impairments Gait;Pain   Shoewear consultation  -     Assessment Comments patient molded with B slippers casts. Good molds made.  -     Rehab Potential Good  -     Patient/caregiver participated in establishment of treatment plan and goals Yes  -        PT Plan    PT Frequency --   1+1 = 2 visits, then prn  -AJ     Predicted Duration of Therapy Intervention (Therapy Eval) Over a 3-8 week period.  -AJ     Planned CPT's? PT EVAL LOW COMPLEXITY: 77873;PT THER SUPP EA 15 MIN   Level 2 orthotics  -AJ     Physical Therapy Interventions (Optional  Details) orthotic fitting/training;patient/family education   Level 2 orthotics  -     PT Plan Comments Patientot be fit and instructed in orthotics.  -DUNCAN       User Key  (r) = Recorded By, (t) = Taken By, (c) = Cosigned By    Initials Name Provider Type    Soha De La Fuente PT Physical Therapist       Other therapeutic activities and/or exercises will be prescribed depending on the patients progress or lack there of.            Exercises     Row Name 11/08/18 1100             Subjective Pain    Able to rate subjective pain? yes  -DUNCAN      Pre-Treatment Pain Level 1  -DUNCAN      Post-Treatment Pain Level 1  -DUNCAN        User Key  (r) = Recorded By, (t) = Taken By, (c) = Cosigned By    Initials Name Provider Type    Soha De La Fuente PT Physical Therapist                                  Time Calculation:   Start Time: 1100  Stop Time: 1153  Time Calculation (min): 53 min     Therapy Charges for Today     Code Description Service Date Service Provider Modifiers Qty    [id number]  PT EVAL MOD COMPLEXITY 2 11/8/2018 Soha Min, PT GP 1    [id number]  PT-CUSTOM ORTHOTICS-LEVEL 2 11/8/2018 Soha Min PT  1    [id number]  PT THER SUPP EA 15 MIN 11/8/2018 Soha Min, PT GP 1                    Soha Min PT, DPT, [facility]  11/8/2018

## 2018-11-30 ENCOUNTER — OFFICE VISIT (OUTPATIENT)
Dept: PODIATRY | Facility: CLINIC | Age: 38
End: 2018-11-30

## 2018-11-30 VITALS — HEIGHT: 66 IN | OXYGEN SATURATION: 99 % | WEIGHT: 190 LBS | BODY MASS INDEX: 30.53 KG/M2 | HEART RATE: 79 BPM

## 2018-11-30 DIAGNOSIS — M72.2 PLANTAR FASCIITIS: Primary | ICD-10-CM

## 2018-11-30 PROCEDURE — 99213 OFFICE O/P EST LOW 20 MIN: CPT | Performed by: PODIATRIST

## 2018-11-30 NOTE — PROGRESS NOTES
Sara Edwards  1980  38 y.o. female   Not diabetic    Patient presents today for a recheck of her bilateral foot pain; L>R.    11/30/2018     Chief Complaint   Patient presents with   • Left Foot - Follow-up, Pain   • Right Foot - Follow-up, Pain   • Plantar Fasciitis       History of Present Illness    Patient presents to clinic today for follow-up.  She continued to complain of pain to the bottom of her heels.  The left foot hurts more than the right foot.  Pain is aggravated with prolonged weightbearing.  She rates the pain currently as a 3 out of 10.  She describes the pain as achy and dull.  She states that she has been fitted for custom orthotics but has not received them yet.  She also states that the meloxicam is making her feel woozy.  She denies any other pedal complaints.    Past Medical History:   Diagnosis Date   • Bilateral foot pain    • Callus    • Chronic right shoulder pain    • Ganglion cyst of left foot    • Ingrown toenail    • Obesity    • Plantar fasciitis          Past Surgical History:   Procedure Laterality Date   • CARPAL TUNNEL RELEASE Right 2015   • KNEE SURGERY Right          Family History   Problem Relation Age of Onset   • Heart disease Mother    • Cancer Mother    • Diabetes Father    • Hypertension Father    • Hyperlipidemia Father    • Heart disease Maternal Grandmother        Allergies   Allergen Reactions   • Penicillins Anaphylaxis   • Sulfa Antibiotics Other (See Comments)     Blister all over body       Social History     Socioeconomic History   • Marital status:      Spouse name: Not on file   • Number of children: Not on file   • Years of education: Not on file   • Highest education level: Not on file   Social Needs   • Financial resource strain: Not on file   • Food insecurity - worry: Not on file   • Food insecurity - inability: Not on file   • Transportation needs - medical: Not on file   • Transportation needs - non-medical: Not on file   Occupational  "History   • Not on file   Tobacco Use   • Smoking status: Former Smoker   • Smokeless tobacco: Never Used   Substance and Sexual Activity   • Alcohol use: Yes     Comment: Ocassionally   • Drug use: No   • Sexual activity: Yes   Other Topics Concern   • Not on file   Social History Narrative   • Not on file         Current Outpatient Medications   Medication Sig Dispense Refill   • Apple Cider Vinegar 500 MG tablet Take 1 tablet by mouth 3 (Three) Times a Day.     • Calcium Polycarbophil (FIBER-CAPS PO) Take 1 syringe by mouth 2 (Two) Times a Day.     • Cholecalciferol (VITAMIN D) 2000 units tablet Take 2,000 Units by mouth Daily. 30 tablet 11   • Cinnamon 500 MG tablet Take 2 tablets by mouth Daily.     • COLLAGEN PO Take 1 tablet by mouth 3 (Three) Times a Day.     • MAGNESIUM PO Take 1 tablet by mouth Daily.     • meloxicam (MOBIC) 15 MG tablet Take 1 tablet by mouth Daily. 30 tablet 1   • Potassium 99 MG tablet Take 1 tablet by mouth Daily.     • rizatriptan MLT (MAXALT-MLT) 10 MG disintegrating tablet Take 1 tablet by mouth 1 (One) Time As Needed for Migraine for up to 1 dose. May repeat in 2 hours if needed 9 tablet 2   • SUPER B COMPLEX/C PO Take 1 tablet by mouth Every Other Day.       No current facility-administered medications for this visit.        Review of Systems   Constitutional: Negative.    Eyes: Negative.    Respiratory: Negative.    Cardiovascular: Negative.    Gastrointestinal: Negative.    Musculoskeletal: Positive for arthralgias.        Bilateral foot pain; L>R   Neurological: Negative.    Psychiatric/Behavioral: Negative.          OBJECTIVE    Pulse 79   Ht 166.4 cm (65.5\")   Wt 86.2 kg (190 lb)   SpO2 99%   BMI 31.14 kg/m²       Physical Exam   Constitutional: She is oriented to person, place, and time. She appears well-developed and well-nourished. No distress.   HENT:   Head: Normocephalic and atraumatic.   Nose: Nose normal.   Pulmonary/Chest: Effort normal. No respiratory distress. " She has no wheezes.   Neurological: She is alert and oriented to person, place, and time. She displays normal reflexes.   Skin: Skin is warm and dry. Capillary refill takes less than 2 seconds.   Psychiatric: She has a normal mood and affect. Her behavior is normal.   Vitals reviewed.      Gait: normal     Assistive Device: none     Lower Extremity    Cardiovascular:    DP/PT pulses palpable bilateral  CFT brisk  to all digits  Skin temp is warm to warm from proximal tibia to distal digits bilateral  Pedal hair growth present.   No erythema or edema noted     Musculoskeletal:  Muscle strength is 5/5 for all muscle groups tested   ROM of the 1st MTP is full without pain or crepitus bilateral  ROM of the ankle joint is full without pain or crepitus  bilateral  Pain on palpation to the medial tubercle of the calcaneus.L>R. negative lateral squeeze test    Dermatological:   Skin is warm, dry and intact bilateral  Webspaces 1-4 bilateral are clean, dry and intact.     Neurological:   Protective sensation intact   Sensation intact to light touch          Procedures        ASSESSMENT AND PLAN    Sara was seen today for plantar fasciitis, follow-up, pain, follow-up and pain.    Diagnoses and all orders for this visit:    Plantar fasciitis      - Patient continues to have pain, L>R.  - Recommended a steroid injection.  Patient declined.  - Continue stretching, icing and over-the-counter anti-inflammatory medications.  Limit barefoot walking.  - Patient is in agreement with the current treatment plan and all questions were answered.  - RTC 2-3 weeks after obtaining custom orthotics.          This document has been electronically signed by Owen Villalobos DPM on November 30, 2018 11:07 AM     11/30/2018  11:07 AM

## 2018-12-06 ENCOUNTER — HOSPITAL ENCOUNTER (OUTPATIENT)
Dept: PHYSICAL THERAPY | Facility: HOSPITAL | Age: 38
Setting detail: THERAPIES SERIES
Discharge: HOME | End: 2018-12-06

## 2018-12-06 DIAGNOSIS — I83.813 VARICOSE VEINS OF BILATERAL LOWER EXTREMITIES WITH PAIN: Primary | ICD-10-CM

## 2018-12-06 DIAGNOSIS — M72.2 PLANTAR FASCIITIS: Primary | ICD-10-CM

## 2018-12-06 NOTE — THERAPY DISCHARGE NOTE
Outpatient Physical Therapy Ortho Treatment Note/Discharge Summary  [facility]  Karissa Garrett PTA       Patient Name: Sara Edwards  : 1980  MRN: [id number]  Today's Date: 2018      Visit Date: 2018     Visits: 2/2  Insurance Visits Approved: orthotics approved  Recert Due: N/A  MD Appt: TBD  Pain: pretreatment 0/10; post treatment 0/10  Improvement: pt is subjectively reporting N/A% improvement since initial evaluation    Visit Dx:    ICD-10-CM ICD-9-CM   1. Plantar fasciitis M72.2 728.71       Patient Active Problem List   Diagnosis   • Chronic right shoulder pain   • Left foot pain   • Chronic migraine        Past Medical History:   Diagnosis Date   • Bilateral foot pain    • Callus    • Chronic right shoulder pain    • Ganglion cyst of left foot    • Ingrown toenail    • Obesity    • Plantar fasciitis         Past Surgical History:   Procedure Laterality Date   • CARPAL TUNNEL RELEASE Right    • KNEE SURGERY Right        PT Ortho     Row Name 18 1300       Subjective Comments    Subjective Comments  states that her shoes are about a year old. has a brand new pair of shoes but they do not feel as good as the current ones she wears. hsa had orthotics previously   -       Precautions and Contraindications    Precautions/Limitations  no known precautions/limitations  -       Subjective Pain    Able to rate subjective pain?  yes  -    Pre-Treatment Pain Level  0  -    Post-Treatment Pain Level  0  -       Ankle Foot Orthosis Management    Type (Ankle/Foot Orthosis)  bilateral;foot orthosis  -    Fabrication Comment (Ankle Foot Orthosis)  B slipper casts molded to be sent to lab for fabrication according to MD specifications  -    Functional Design (Ankle Foot Orthosis)  static orthosis  -    Therapeutic Indications (Ankle Foot Orthosis)  pain management;rest/inflammation reduction  -    Wearing Schedule (Ankle Foot Orthosis)  wear 2 hours  on/2 hours off;wear full time;wear with activity/work;other (see comments) start with gradual wear progressing to full time.   -    Orthosis Training (Ankle Foot Orthosis)  patient;all orthosis skills;able to verbalize training;able to show back training;able to teach back training;meets goals/outcomes  -    Compliance/Wearing Issues (Ankle Foot Orthosis)  patient/caregiver comprehend strategies  -    Sensory Assessment (Ankle Foot Orthosis)  no sensory issues noted B  -    Skin Assessment (Ankle Foot Orthosis)  skin intact, no issues noted bilaterally  -      User Key  (r) = Recorded By, (t) = Taken By, (c) = Cosigned By    Initials Name Provider Type    Karissa Kirkpatrick PTA Physical Therapy Assistant                      PT Assessment/Plan     Row Name 12/06/18 1400          PT Assessment    Assessment Comments  patient had good fit with orthotics at this time. no skin issues noted. good fit noted.   -        PT Plan    PT Frequency  -- 1+1= 2 visits, then PRN  -     PT Plan Comments  discharge at this time with patient to contact if further need for adjustments or changes  -       User Key  (r) = Recorded By, (t) = Taken By, (c) = Cosigned By    Initials Name Provider Type     Karissa Garrett, PTA Physical Therapy Assistant              Exercises     Row Name 12/06/18 1300             Subjective Comments    Subjective Comments  states that her shoes are about a year old. has a brand new pair of shoes but they do not feel as good as the current ones she wears. hsa had orthotics previously   -         Subjective Pain    Able to rate subjective pain?  yes  -      Pre-Treatment Pain Level  0  -      Post-Treatment Pain Level  0  -        User Key  (r) = Recorded By, (t) = Taken By, (c) = Cosigned By    Initials Name Provider Type    Karissa Kirkpatrick PTA Physical Therapy Assistant                         PT OP Goals     Row Name 12/06/18 1300          PT Short Term Goals    STG Date to  Achieve  12/07/18  -     STG 1  Patientot be molded for B orthotics.  -     STG 1 Progress  Met  -     STG 2  patient to show proper shoewear choices.  -     STG 2 Progress  Ongoing  -     STG 3  Patientot be instructed in use/care and verbalize understanding.  -     STG 3 Progress  Met  -     STG 4  Patientot be instructed in orthotic wear schedule and verbalize understanding.  -     STG 4 Progress  Met  -     STG 5  Patientot show understanding of skin checks and be able to verbalize.  -     STG 5 Progress  Met  -        Long Term Goals    LTG Date to Achieve  02/02/18  -     LTG 1  Patientot come in for orthotic adjustments prn.  -     LTG 1 Progress  Ongoing  -        Time Calculation    PT Goal Re-Cert Due Date  -- N/A  -       User Key  (r) = Recorded By, (t) = Taken By, (c) = Cosigned By    Initials Name Provider Type     Karissa Garrett PTA Physical Therapy Assistant          Therapy Education  Education Details: wear schedule and care of orthotics  Given: HEP, Symptoms/condition management, Pain management  Program: Reinforced, New  How Provided: Verbal, Demonstration  Provided to: Patient  Level of Understanding: Verbalized, Demonstrated, Teach back education performed              Time Calculation:   Start Time: 1340  Stop Time: 1355  Time Calculation (min): 15 min  PT Non-Billable Time (min): 15 min  Total Timed Code Minutes- PT: 0 minute(s)    Therapy Charges for Today     Code Description Service Date Service Provider Modifiers Qty    77496660003 HC PT THER SUPP EA 15 MIN 12/6/2018 Karissa Garrett PTA GP 2                OP PT Discharge Summary  Date of Discharge: 12/06/18  Reason for Discharge: All goals achieved  Outcomes Achieved: Able to achieve all goals within established timeline  Discharge Destination: Home without follow-up  Discharge Instructions/Additional Comments: pt to return if adjustments needed      Karissa Garrett PTA  12/6/2018

## 2019-01-14 ENCOUNTER — OFFICE VISIT (OUTPATIENT)
Dept: CARDIAC SURGERY | Facility: CLINIC | Age: 39
End: 2019-01-14

## 2019-01-14 VITALS
SYSTOLIC BLOOD PRESSURE: 118 MMHG | DIASTOLIC BLOOD PRESSURE: 74 MMHG | BODY MASS INDEX: 32.14 KG/M2 | HEIGHT: 66 IN | HEART RATE: 62 BPM | OXYGEN SATURATION: 99 % | WEIGHT: 200 LBS

## 2019-01-14 DIAGNOSIS — I10 BENIGN ESSENTIAL HTN: ICD-10-CM

## 2019-01-14 DIAGNOSIS — E78.2 MIXED HYPERLIPIDEMIA: ICD-10-CM

## 2019-01-14 DIAGNOSIS — E66.09 CLASS 1 OBESITY DUE TO EXCESS CALORIES WITH SERIOUS COMORBIDITY AND BODY MASS INDEX (BMI) OF 32.0 TO 32.9 IN ADULT: ICD-10-CM

## 2019-01-14 DIAGNOSIS — I83.813 VARICOSE VEINS OF BOTH LOWER EXTREMITIES WITH PAIN: Primary | ICD-10-CM

## 2019-01-14 PROCEDURE — 99204 OFFICE O/P NEW MOD 45 MIN: CPT | Performed by: THORACIC SURGERY (CARDIOTHORACIC VASCULAR SURGERY)

## 2019-01-14 RX ORDER — SODIUM CHLORIDE 9 MG/ML
100 INJECTION, SOLUTION INTRAVENOUS CONTINUOUS
Status: CANCELLED | OUTPATIENT
Start: 2019-02-08

## 2019-01-14 RX ORDER — CLINDAMYCIN PHOSPHATE 900 MG/50ML
900 INJECTION INTRAVENOUS ONCE
Status: CANCELLED | OUTPATIENT
Start: 2019-02-08 | End: 2019-01-14

## 2019-01-27 PROBLEM — E78.2 MIXED HYPERLIPIDEMIA: Status: ACTIVE | Noted: 2019-01-27

## 2019-01-27 PROBLEM — E66.09 CLASS 1 OBESITY DUE TO EXCESS CALORIES WITH SERIOUS COMORBIDITY AND BODY MASS INDEX (BMI) OF 32.0 TO 32.9 IN ADULT: Status: ACTIVE | Noted: 2019-01-27

## 2019-01-27 PROBLEM — I10 BENIGN ESSENTIAL HTN: Status: ACTIVE | Noted: 2019-01-27

## 2019-01-27 NOTE — PROGRESS NOTES
1/14/2019    Sara Edwards  1980    Chief Complaint:    Chief Complaint   Patient presents with   • Varicose Veins   • Leg Pain       HPI:      PCP:  Odalis Borja, DELPHINE    38 y.o. female with HTN(stable, increased risk stroke, rupture), Hyperlipidemia(stable, increased risk cardiovascular events) and Obesity(uncontrolled, increased risk cardiovascular events) , varicose veins(new, increase risk venous stasis).  former smoker.  Moderate pain itching RIGHT lower extremity thigh to knee x 15yrs.  No swelling.  Has used compression stockings causes worse pain..  No TIA stroke amaurosis.  No MI claudication. No other associated signs, symptoms or modifying factors.    1/2019 Lower extremity venous duplex:  RIGHT no dvt.  Reflux GSV above knee.  Multiple varicosities thigh.  Bulbous area 15mm at knee.  LEFT no dvt.  No reflux.    The following portions of the patient's history were reviewed and updated as appropriate: allergies, current medications, past family history, past medical history, past social history, past surgical history and problem list.  Recent images independently reviewed.  Available laboratory values reviewed.    PMH:  Past Medical History:   Diagnosis Date   • Bilateral foot pain    • Callus    • Chronic right shoulder pain    • Ganglion cyst of left foot    • Ingrown toenail    • Obesity    • Plantar fasciitis      Family History   Problem Relation Age of Onset   • Heart disease Mother    • Cancer Mother    • Diabetes Father    • Hypertension Father    • Hyperlipidemia Father    • Heart disease Maternal Grandmother      Social History     Tobacco Use   • Smoking status: Former Smoker   • Smokeless tobacco: Never Used   Substance Use Topics   • Alcohol use: Yes     Comment: Ocassionally   • Drug use: No       ALLERGIES:  Allergies   Allergen Reactions   • Penicillins Anaphylaxis   • Sulfa Antibiotics Other (See Comments)     Blister all over body         MEDICATIONS:    Current Outpatient  Medications:   •  Apple Cider Vinegar 500 MG tablet, Take 1 tablet by mouth 3 (Three) Times a Day., Disp: , Rfl:   •  Calcium Polycarbophil (FIBER-CAPS PO), Take 1 syringe by mouth 2 (Two) Times a Day., Disp: , Rfl:   •  Cholecalciferol (VITAMIN D) 2000 units tablet, Take 2,000 Units by mouth Daily., Disp: 30 tablet, Rfl: 11  •  Cinnamon 500 MG tablet, Take 2 tablets by mouth Daily., Disp: , Rfl:   •  COLLAGEN PO, Take 1 tablet by mouth 3 (Three) Times a Day., Disp: , Rfl:   •  MAGNESIUM PO, Take 1 tablet by mouth Daily., Disp: , Rfl:   •  meloxicam (MOBIC) 15 MG tablet, Take 1 tablet by mouth Daily., Disp: 30 tablet, Rfl: 1  •  Potassium 99 MG tablet, Take 1 tablet by mouth Daily., Disp: , Rfl:   •  rizatriptan MLT (MAXALT-MLT) 10 MG disintegrating tablet, Take 1 tablet by mouth 1 (One) Time As Needed for Migraine for up to 1 dose. May repeat in 2 hours if needed, Disp: 9 tablet, Rfl: 2  •  SUPER B COMPLEX/C PO, Take 1 tablet by mouth Every Other Day., Disp: , Rfl:     Review of Systems   Review of Systems   Constitution: Positive for night sweats and weight loss. Negative for malaise/fatigue.   HENT: Negative for hearing loss, hoarse voice and stridor.    Eyes: Negative for vision loss in left eye, vision loss in right eye and visual disturbance.   Cardiovascular: Positive for chest pain. Negative for leg swelling and palpitations.   Respiratory: Negative for cough, hemoptysis and shortness of breath.    Hematologic/Lymphatic: Negative for adenopathy and bleeding problem. Does not bruise/bleed easily.   Skin: Positive for itching. Negative for color change, poor wound healing and rash.   Musculoskeletal: Positive for arthritis, back pain and neck pain. Negative for muscle weakness.   Gastrointestinal: Negative for abdominal pain, dysphagia and heartburn.   Neurological: Positive for headaches. Negative for dizziness, numbness and seizures.   Psychiatric/Behavioral: Negative for altered mental status, depression  and memory loss. The patient is not nervous/anxious.        Physical Exam   Physical Exam   Constitutional: She is oriented to person, place, and time. She is active and cooperative. She does not appear ill. No distress.   HENT:   Head: Atraumatic.   Right Ear: Hearing normal.   Left Ear: Hearing normal.   Nose: No nasal deformity. No epistaxis.   Mouth/Throat: She does not have dentures. Normal dentition.   Eyes: Conjunctivae and lids are normal. Right pupil is round and reactive. Left pupil is round and reactive.   Neck: No JVD present. Carotid bruit is not present. No tracheal deviation present. No thyroid mass and no thyromegaly present.   Cardiovascular: Normal rate and regular rhythm.   No murmur heard.  Pulses:       Carotid pulses are 2+ on the right side, and 2+ on the left side.       Radial pulses are 2+ on the right side, and 2+ on the left side.        Dorsalis pedis pulses are 2+ on the right side, and 2+ on the left side.        Posterior tibial pulses are 2+ on the right side, and 2+ on the left side.   Moderate RIGHT thigh varicosities   Pulmonary/Chest: Effort normal and breath sounds normal.   Abdominal: Soft. She exhibits no distension and no mass. There is no splenomegaly or hepatomegaly. There is no tenderness.   Musculoskeletal: She exhibits no deformity.   Gait normal.    Lymphadenopathy:     She has no cervical adenopathy.        Right: No supraclavicular adenopathy present.        Left: No supraclavicular adenopathy present.   Neurological: She is alert and oriented to person, place, and time. She has normal strength.   Skin: Skin is warm and dry. No cyanosis or erythema. No pallor.   No venous staining   Psychiatric: She has a normal mood and affect. Her speech is normal. Judgment and thought content normal.     Results for RUBY GALVAN (MRN [id number]) as of 1/27/2019 14:53  GFR 96 Ref. Range 8/23/2018 09:22   Creatinine Latest Ref Range: 0.50 - 1.00 mg/dL 0.69   BUN Latest Ref  Range: 7 - 21 mg/dL 9     ASSESSMENT:  Sara was seen today for varicose veins and leg pain.    Diagnoses and all orders for this visit:    Varicose veins of both lower extremities with pain  -     Duplex Venous Lower Extremity - Bilateral CAR  -     Case Request; Standing  -     Sodium chloride 0.9 % infusion; Infuse 100 mL/hr into a venous catheter Continuous.  -     clindamycin (CLEOCIN) 900 mg in dextrose (D5W) 5 % 100 mL IVPB; Infuse 900 mg into a venous catheter 1 (One) Time.  -     Case Request    Benign essential HTN    Class 1 obesity due to excess calories with serious comorbidity and body mass index (BMI) of 32.0 to 32.9 in adult    Mixed hyperlipidemia    Other orders  -     Provide NPO Instructions to Patient; Future  -     Obtain informed consent; Standing  -     Clip groin; Standing    PLAN:  Detailed discussion with Sara Edwards regarding situation and options.  worsening symptoms, pain and swelling related to severe varicose veins.  conservative measures have been unsuccessful (weight reduction, daily exercise program, compression stockings, leg elevation)  Surgical intervention is advisable.    Risks:  bleeding, infection, transfusion, blood clots, additional procedures may be required.  Benefits:  relief of symptoms, improvement in lifestyle and ability to stand for work.  Options:  vein stripping, multiple stab phlebectomy.  Understands and wishes to proceed.    Endovenous ablation of RIGHT greater saphenous vein, RIGHT lower extremity stab phlebectomies. tumescent anesthesia.  GEN.  SDS.  2/8/2019    Return after above studies complete  Obesity Class  1. Increased risk cardiovascular events, sleep and breathing disorders, joint issues, type 2 diabetes mellitus. Options for weight management, heart healthy diet, exercise programs, and associated health risks of obesity discussed.  Recommended regular physical activity, progressive walking program.  Continue current medications as  directed.  Will Obtain relevant old records.    Thank you for the opportunity to participate in this patient's care.    Copy to primary care provider.    EMR Dragon/Transcription disclaimer:   Much of this encounter note is an electronic transcription/translation of spoken language to printed text. The electronic translation of spoken language may permit erroneous, or at times, nonsensical words or phrases to be inadvertently transcribed; Although I have reviewed the note for such errors, some may still exist.

## 2019-01-28 LAB
BH CV LOWER VASCULAR LEFT COMMON FEMORAL AUGMENT: NORMAL
BH CV LOWER VASCULAR LEFT COMMON FEMORAL COMPETENT: NORMAL
BH CV LOWER VASCULAR LEFT COMMON FEMORAL COMPRESS: NORMAL
BH CV LOWER VASCULAR LEFT COMMON FEMORAL PHASIC: NORMAL
BH CV LOWER VASCULAR LEFT COMMON FEMORAL SPONT: NORMAL
BH CV LOWER VASCULAR LEFT DISTAL FEMORAL AUGMENT: NORMAL
BH CV LOWER VASCULAR LEFT DISTAL FEMORAL COMPETENT: NORMAL
BH CV LOWER VASCULAR LEFT DISTAL FEMORAL COMPRESS: NORMAL
BH CV LOWER VASCULAR LEFT DISTAL FEMORAL PHASIC: NORMAL
BH CV LOWER VASCULAR LEFT DISTAL FEMORAL SPONT: NORMAL
BH CV LOWER VASCULAR LEFT GASTRONEMIUS COMPRESS: NORMAL
BH CV LOWER VASCULAR LEFT GREATER SAPH AK AUGMENT: NORMAL
BH CV LOWER VASCULAR LEFT GREATER SAPH AK COMPETENT: NORMAL
BH CV LOWER VASCULAR LEFT GREATER SAPH AK COMPRESS: NORMAL
BH CV LOWER VASCULAR LEFT GREATER SAPH AK PHASIC: NORMAL
BH CV LOWER VASCULAR LEFT GREATER SAPH AK SPONT: NORMAL
BH CV LOWER VASCULAR LEFT GREATER SAPH BK AUGMENT: NORMAL
BH CV LOWER VASCULAR LEFT GREATER SAPH BK COMPETENT: NORMAL
BH CV LOWER VASCULAR LEFT GREATER SAPH BK COMPRESS: NORMAL
BH CV LOWER VASCULAR LEFT LESSER SAPH AUGMENT: NORMAL
BH CV LOWER VASCULAR LEFT LESSER SAPH COMPETENT: NORMAL
BH CV LOWER VASCULAR LEFT LESSER SAPH COMPRESS: NORMAL
BH CV LOWER VASCULAR LEFT MID FEMORAL AUGMENT: NORMAL
BH CV LOWER VASCULAR LEFT MID FEMORAL COMPETENT: NORMAL
BH CV LOWER VASCULAR LEFT MID FEMORAL COMPRESS: NORMAL
BH CV LOWER VASCULAR LEFT MID FEMORAL PHASIC: NORMAL
BH CV LOWER VASCULAR LEFT MID FEMORAL SPONT: NORMAL
BH CV LOWER VASCULAR LEFT PERONEAL AUGMENT: NORMAL
BH CV LOWER VASCULAR LEFT PERONEAL COMPETENT: NORMAL
BH CV LOWER VASCULAR LEFT PERONEAL COMPRESS: NORMAL
BH CV LOWER VASCULAR LEFT POPLITEAL AUGMENT: NORMAL
BH CV LOWER VASCULAR LEFT POPLITEAL COMPETENT: NORMAL
BH CV LOWER VASCULAR LEFT POPLITEAL COMPRESS: NORMAL
BH CV LOWER VASCULAR LEFT POPLITEAL PHASIC: NORMAL
BH CV LOWER VASCULAR LEFT POPLITEAL SPONT: NORMAL
BH CV LOWER VASCULAR LEFT POSTERIOR TIBIAL AUGMENT: NORMAL
BH CV LOWER VASCULAR LEFT POSTERIOR TIBIAL COMPETENT: NORMAL
BH CV LOWER VASCULAR LEFT POSTERIOR TIBIAL COMPRESS: NORMAL
BH CV LOWER VASCULAR LEFT PROFUNDA FEMORAL AUGMENT: NORMAL
BH CV LOWER VASCULAR LEFT PROFUNDA FEMORAL COMPETENT: NORMAL
BH CV LOWER VASCULAR LEFT PROFUNDA FEMORAL COMPRESS: NORMAL
BH CV LOWER VASCULAR LEFT PROFUNDA FEMORAL PHASIC: NORMAL
BH CV LOWER VASCULAR LEFT PROFUNDA FEMORAL SPONT: NORMAL
BH CV LOWER VASCULAR LEFT PROXIMAL FEMORAL AUGMENT: NORMAL
BH CV LOWER VASCULAR LEFT PROXIMAL FEMORAL COMPETENT: NORMAL
BH CV LOWER VASCULAR LEFT PROXIMAL FEMORAL COMPRESS: NORMAL
BH CV LOWER VASCULAR LEFT PROXIMAL FEMORAL PHASIC: NORMAL
BH CV LOWER VASCULAR LEFT PROXIMAL FEMORAL SPONT: NORMAL
BH CV LOWER VASCULAR LEFT SAPHENOFEMORAL JUNCTION AUGMENT: NORMAL
BH CV LOWER VASCULAR LEFT SAPHENOFEMORAL JUNCTION COMPETENT: NORMAL
BH CV LOWER VASCULAR LEFT SAPHENOFEMORAL JUNCTION COMPRESS: NORMAL
BH CV LOWER VASCULAR LEFT SAPHENOFEMORAL JUNCTION PHASIC: NORMAL
BH CV LOWER VASCULAR LEFT SAPHENOFEMORAL JUNCTION SPONT: NORMAL
BH CV LOWER VASCULAR RIGHT COMMON FEMORAL AUGMENT: NORMAL
BH CV LOWER VASCULAR RIGHT COMMON FEMORAL COMPETENT: NORMAL
BH CV LOWER VASCULAR RIGHT COMMON FEMORAL COMPRESS: NORMAL
BH CV LOWER VASCULAR RIGHT COMMON FEMORAL PHASIC: NORMAL
BH CV LOWER VASCULAR RIGHT COMMON FEMORAL SPONT: NORMAL
BH CV LOWER VASCULAR RIGHT DISTAL FEMORAL AUGMENT: NORMAL
BH CV LOWER VASCULAR RIGHT DISTAL FEMORAL COMPETENT: NORMAL
BH CV LOWER VASCULAR RIGHT DISTAL FEMORAL COMPRESS: NORMAL
BH CV LOWER VASCULAR RIGHT DISTAL FEMORAL PHASIC: NORMAL
BH CV LOWER VASCULAR RIGHT DISTAL FEMORAL SPONT: NORMAL
BH CV LOWER VASCULAR RIGHT GASTRONEMIUS COMPRESS: NORMAL
BH CV LOWER VASCULAR RIGHT GREATER SAPH AK AUGMENT: NORMAL
BH CV LOWER VASCULAR RIGHT GREATER SAPH AK COMPETENT: NORMAL
BH CV LOWER VASCULAR RIGHT GREATER SAPH AK COMPRESS: NORMAL
BH CV LOWER VASCULAR RIGHT GREATER SAPH AK PHASIC: NORMAL
BH CV LOWER VASCULAR RIGHT GREATER SAPH AK SPONT: NORMAL
BH CV LOWER VASCULAR RIGHT GREATER SAPH BK AUGMENT: NORMAL
BH CV LOWER VASCULAR RIGHT GREATER SAPH BK COMPETENT: NORMAL
BH CV LOWER VASCULAR RIGHT GREATER SAPH BK COMPRESS: NORMAL
BH CV LOWER VASCULAR RIGHT LESSER SAPH COMPRESS: NORMAL
BH CV LOWER VASCULAR RIGHT MID FEMORAL AUGMENT: NORMAL
BH CV LOWER VASCULAR RIGHT MID FEMORAL COMPETENT: NORMAL
BH CV LOWER VASCULAR RIGHT MID FEMORAL COMPRESS: NORMAL
BH CV LOWER VASCULAR RIGHT MID FEMORAL PHASIC: NORMAL
BH CV LOWER VASCULAR RIGHT MID FEMORAL SPONT: NORMAL
BH CV LOWER VASCULAR RIGHT PERONEAL AUGMENT: NORMAL
BH CV LOWER VASCULAR RIGHT PERONEAL COMPETENT: NORMAL
BH CV LOWER VASCULAR RIGHT PERONEAL COMPRESS: NORMAL
BH CV LOWER VASCULAR RIGHT POPLITEAL AUGMENT: NORMAL
BH CV LOWER VASCULAR RIGHT POPLITEAL COMPETENT: NORMAL
BH CV LOWER VASCULAR RIGHT POPLITEAL COMPRESS: NORMAL
BH CV LOWER VASCULAR RIGHT POPLITEAL PHASIC: NORMAL
BH CV LOWER VASCULAR RIGHT POPLITEAL SPONT: NORMAL
BH CV LOWER VASCULAR RIGHT POSTERIOR TIBIAL AUGMENT: NORMAL
BH CV LOWER VASCULAR RIGHT POSTERIOR TIBIAL COMPETENT: NORMAL
BH CV LOWER VASCULAR RIGHT POSTERIOR TIBIAL COMPRESS: NORMAL
BH CV LOWER VASCULAR RIGHT PROFUNDA FEMORAL AUGMENT: NORMAL
BH CV LOWER VASCULAR RIGHT PROFUNDA FEMORAL COMPETENT: NORMAL
BH CV LOWER VASCULAR RIGHT PROFUNDA FEMORAL COMPRESS: NORMAL
BH CV LOWER VASCULAR RIGHT PROFUNDA FEMORAL PHASIC: NORMAL
BH CV LOWER VASCULAR RIGHT PROFUNDA FEMORAL SPONT: NORMAL
BH CV LOWER VASCULAR RIGHT PROXIMAL FEMORAL AUGMENT: NORMAL
BH CV LOWER VASCULAR RIGHT PROXIMAL FEMORAL COMPETENT: NORMAL
BH CV LOWER VASCULAR RIGHT PROXIMAL FEMORAL COMPRESS: NORMAL
BH CV LOWER VASCULAR RIGHT PROXIMAL FEMORAL PHASIC: NORMAL
BH CV LOWER VASCULAR RIGHT PROXIMAL FEMORAL SPONT: NORMAL
BH CV LOWER VASCULAR RIGHT SAPHENOFEMORAL JUNCTION AUGMENT: NORMAL
BH CV LOWER VASCULAR RIGHT SAPHENOFEMORAL JUNCTION COMPETENT: NORMAL
BH CV LOWER VASCULAR RIGHT SAPHENOFEMORAL JUNCTION COMPRESS: NORMAL
BH CV LOWER VASCULAR RIGHT SAPHENOFEMORAL JUNCTION PHASIC: NORMAL
BH CV LOWER VASCULAR RIGHT SAPHENOFEMORAL JUNCTION SPONT: NORMAL
BH CV LOWER VASCULAR RIGHT VARICOSITY AK AUGMENT: NORMAL
BH CV LOWER VASCULAR RIGHT VARICOSITY AK COMPETENT: NORMAL
BH CV LOWER VASCULAR RIGHT VARICOSITY AK COMPRESS: NORMAL

## 2019-02-04 ENCOUNTER — APPOINTMENT (OUTPATIENT)
Dept: PREADMISSION TESTING | Facility: HOSPITAL | Age: 39
End: 2019-02-04

## 2019-02-04 VITALS
HEIGHT: 66 IN | BODY MASS INDEX: 31.5 KG/M2 | OXYGEN SATURATION: 98 % | RESPIRATION RATE: 18 BRPM | SYSTOLIC BLOOD PRESSURE: 110 MMHG | DIASTOLIC BLOOD PRESSURE: 80 MMHG | WEIGHT: 196 LBS | HEART RATE: 69 BPM

## 2019-02-04 RX ORDER — MELOXICAM 15 MG/1
15 TABLET ORAL DAILY PRN
COMMUNITY
End: 2020-09-10

## 2019-02-07 ENCOUNTER — ANESTHESIA EVENT (OUTPATIENT)
Dept: PERIOP | Facility: HOSPITAL | Age: 39
End: 2019-02-07

## 2019-02-07 DIAGNOSIS — I83.813 VARICOSE VEINS OF BOTH LOWER EXTREMITIES WITH PAIN: Primary | ICD-10-CM

## 2019-02-08 ENCOUNTER — HOSPITAL ENCOUNTER (OUTPATIENT)
Facility: HOSPITAL | Age: 39
Setting detail: HOSPITAL OUTPATIENT SURGERY
Discharge: HOME | End: 2019-02-08
Attending: THORACIC SURGERY (CARDIOTHORACIC VASCULAR SURGERY) | Admitting: THORACIC SURGERY (CARDIOTHORACIC VASCULAR SURGERY)

## 2019-02-08 ENCOUNTER — ANESTHESIA (OUTPATIENT)
Dept: PERIOP | Facility: HOSPITAL | Age: 39
End: 2019-02-08

## 2019-02-08 VITALS
HEIGHT: 66 IN | RESPIRATION RATE: 18 BRPM | BODY MASS INDEX: 31.46 KG/M2 | SYSTOLIC BLOOD PRESSURE: 122 MMHG | HEART RATE: 72 BPM | WEIGHT: 195.77 LBS | TEMPERATURE: 97.9 F | OXYGEN SATURATION: 100 % | DIASTOLIC BLOOD PRESSURE: 67 MMHG

## 2019-02-08 DIAGNOSIS — I83.813 VARICOSE VEINS OF BOTH LOWER EXTREMITIES WITH PAIN: ICD-10-CM

## 2019-02-08 PROCEDURE — 25010000002 ONDANSETRON PER 1 MG: Performed by: NURSE ANESTHETIST, CERTIFIED REGISTERED

## 2019-02-08 PROCEDURE — 88304 TISSUE EXAM BY PATHOLOGIST: CPT | Performed by: THORACIC SURGERY (CARDIOTHORACIC VASCULAR SURGERY)

## 2019-02-08 PROCEDURE — 25010000002 HEPARIN (PORCINE) PER 1000 UNITS: Performed by: THORACIC SURGERY (CARDIOTHORACIC VASCULAR SURGERY)

## 2019-02-08 PROCEDURE — 25010000002 MIDAZOLAM PER 1 MG: Performed by: NURSE ANESTHETIST, CERTIFIED REGISTERED

## 2019-02-08 PROCEDURE — 25010000002 FENTANYL CITRATE (PF) 100 MCG/2ML SOLUTION: Performed by: NURSE ANESTHETIST, CERTIFIED REGISTERED

## 2019-02-08 PROCEDURE — C1894 INTRO/SHEATH, NON-LASER: HCPCS | Performed by: THORACIC SURGERY (CARDIOTHORACIC VASCULAR SURGERY)

## 2019-02-08 PROCEDURE — 88304 TISSUE EXAM BY PATHOLOGIST: CPT | Performed by: PATHOLOGY

## 2019-02-08 PROCEDURE — 25010000002 HYDROMORPHONE 1 MG/ML SOLUTION: Performed by: NURSE ANESTHETIST, CERTIFIED REGISTERED

## 2019-02-08 PROCEDURE — 36475 ENDOVENOUS RF 1ST VEIN: CPT | Performed by: THORACIC SURGERY (CARDIOTHORACIC VASCULAR SURGERY)

## 2019-02-08 PROCEDURE — 37765 STAB PHLEB VEINS XTR 10-20: CPT | Performed by: THORACIC SURGERY (CARDIOTHORACIC VASCULAR SURGERY)

## 2019-02-08 PROCEDURE — C1769 GUIDE WIRE: HCPCS | Performed by: THORACIC SURGERY (CARDIOTHORACIC VASCULAR SURGERY)

## 2019-02-08 PROCEDURE — 25010000002 DEXAMETHASONE PER 1 MG: Performed by: NURSE ANESTHETIST, CERTIFIED REGISTERED

## 2019-02-08 PROCEDURE — 25010000002 PROPOFOL 10 MG/ML EMULSION: Performed by: NURSE ANESTHETIST, CERTIFIED REGISTERED

## 2019-02-08 PROCEDURE — C1888 ENDOVAS NON-CARDIAC ABL CATH: HCPCS | Performed by: THORACIC SURGERY (CARDIOTHORACIC VASCULAR SURGERY)

## 2019-02-08 RX ORDER — PROPOFOL 10 MG/ML
VIAL (ML) INTRAVENOUS AS NEEDED
Status: DISCONTINUED | OUTPATIENT
Start: 2019-02-08 | End: 2019-02-08 | Stop reason: SURG

## 2019-02-08 RX ORDER — ACETAMINOPHEN 325 MG/1
650 TABLET ORAL ONCE
Status: DISCONTINUED | OUTPATIENT
Start: 2019-02-08 | End: 2019-02-08 | Stop reason: HOSPADM

## 2019-02-08 RX ORDER — HYDROCODONE BITARTRATE AND ACETAMINOPHEN 5; 325 MG/1; MG/1
1 TABLET ORAL ONCE AS NEEDED
Status: DISCONTINUED | OUTPATIENT
Start: 2019-02-08 | End: 2019-02-08 | Stop reason: HOSPADM

## 2019-02-08 RX ORDER — SODIUM CHLORIDE 9 MG/ML
100 INJECTION, SOLUTION INTRAVENOUS CONTINUOUS
Status: DISCONTINUED | OUTPATIENT
Start: 2019-02-08 | End: 2019-02-08 | Stop reason: HOSPADM

## 2019-02-08 RX ORDER — FLUMAZENIL 0.1 MG/ML
0.2 INJECTION INTRAVENOUS AS NEEDED
Status: DISCONTINUED | OUTPATIENT
Start: 2019-02-08 | End: 2019-02-08 | Stop reason: HOSPADM

## 2019-02-08 RX ORDER — DEXAMETHASONE SODIUM PHOSPHATE 4 MG/ML
INJECTION, SOLUTION INTRA-ARTICULAR; INTRALESIONAL; INTRAMUSCULAR; INTRAVENOUS; SOFT TISSUE AS NEEDED
Status: DISCONTINUED | OUTPATIENT
Start: 2019-02-08 | End: 2019-02-08 | Stop reason: SURG

## 2019-02-08 RX ORDER — DIPHENHYDRAMINE HYDROCHLORIDE 50 MG/ML
12.5 INJECTION INTRAMUSCULAR; INTRAVENOUS
Status: DISCONTINUED | OUTPATIENT
Start: 2019-02-08 | End: 2019-02-08 | Stop reason: HOSPADM

## 2019-02-08 RX ORDER — ONDANSETRON 2 MG/ML
INJECTION INTRAMUSCULAR; INTRAVENOUS AS NEEDED
Status: DISCONTINUED | OUTPATIENT
Start: 2019-02-08 | End: 2019-02-08 | Stop reason: SURG

## 2019-02-08 RX ORDER — ACETAMINOPHEN 325 MG/1
650 TABLET ORAL ONCE AS NEEDED
Status: DISCONTINUED | OUTPATIENT
Start: 2019-02-08 | End: 2019-02-08 | Stop reason: HOSPADM

## 2019-02-08 RX ORDER — LIDOCAINE HYDROCHLORIDE 20 MG/ML
INJECTION, SOLUTION INFILTRATION; PERINEURAL AS NEEDED
Status: DISCONTINUED | OUTPATIENT
Start: 2019-02-08 | End: 2019-02-08 | Stop reason: SURG

## 2019-02-08 RX ORDER — EPHEDRINE SULFATE 50 MG/ML
INJECTION, SOLUTION INTRAVENOUS AS NEEDED
Status: DISCONTINUED | OUTPATIENT
Start: 2019-02-08 | End: 2019-02-08 | Stop reason: SURG

## 2019-02-08 RX ORDER — ONDANSETRON 2 MG/ML
4 INJECTION INTRAMUSCULAR; INTRAVENOUS ONCE AS NEEDED
Status: DISCONTINUED | OUTPATIENT
Start: 2019-02-08 | End: 2019-02-08 | Stop reason: HOSPADM

## 2019-02-08 RX ORDER — ONDANSETRON 4 MG/1
4 TABLET, FILM COATED ORAL ONCE AS NEEDED
Status: DISCONTINUED | OUTPATIENT
Start: 2019-02-08 | End: 2019-02-08 | Stop reason: HOSPADM

## 2019-02-08 RX ORDER — MIDAZOLAM HYDROCHLORIDE 1 MG/ML
INJECTION INTRAMUSCULAR; INTRAVENOUS AS NEEDED
Status: DISCONTINUED | OUTPATIENT
Start: 2019-02-08 | End: 2019-02-08 | Stop reason: SURG

## 2019-02-08 RX ORDER — CLINDAMYCIN PHOSPHATE 900 MG/50ML
900 INJECTION INTRAVENOUS ONCE
Status: COMPLETED | OUTPATIENT
Start: 2019-02-08 | End: 2019-02-08

## 2019-02-08 RX ORDER — EPHEDRINE SULFATE 50 MG/ML
5 INJECTION, SOLUTION INTRAVENOUS ONCE AS NEEDED
Status: DISCONTINUED | OUTPATIENT
Start: 2019-02-08 | End: 2019-02-08 | Stop reason: HOSPADM

## 2019-02-08 RX ORDER — ACETAMINOPHEN 650 MG/1
650 SUPPOSITORY RECTAL ONCE AS NEEDED
Status: DISCONTINUED | OUTPATIENT
Start: 2019-02-08 | End: 2019-02-08 | Stop reason: HOSPADM

## 2019-02-08 RX ORDER — SODIUM CHLORIDE, SODIUM GLUCONATE, SODIUM ACETATE, POTASSIUM CHLORIDE, AND MAGNESIUM CHLORIDE 526; 502; 368; 37; 30 MG/100ML; MG/100ML; MG/100ML; MG/100ML; MG/100ML
INJECTION, SOLUTION INTRAVENOUS CONTINUOUS PRN
Status: DISCONTINUED | OUTPATIENT
Start: 2019-02-08 | End: 2019-02-08 | Stop reason: SURG

## 2019-02-08 RX ORDER — LABETALOL HYDROCHLORIDE 5 MG/ML
5 INJECTION, SOLUTION INTRAVENOUS
Status: DISCONTINUED | OUTPATIENT
Start: 2019-02-08 | End: 2019-02-08 | Stop reason: HOSPADM

## 2019-02-08 RX ORDER — SODIUM CHLORIDE 9 MG/ML
INJECTION, SOLUTION INTRAVENOUS AS NEEDED
Status: DISCONTINUED | OUTPATIENT
Start: 2019-02-08 | End: 2019-02-08 | Stop reason: HOSPADM

## 2019-02-08 RX ORDER — HEPARIN SODIUM 5000 [USP'U]/ML
INJECTION, SOLUTION INTRAVENOUS; SUBCUTANEOUS AS NEEDED
Status: DISCONTINUED | OUTPATIENT
Start: 2019-02-08 | End: 2019-02-08 | Stop reason: HOSPADM

## 2019-02-08 RX ORDER — FENTANYL CITRATE 50 UG/ML
INJECTION, SOLUTION INTRAMUSCULAR; INTRAVENOUS AS NEEDED
Status: DISCONTINUED | OUTPATIENT
Start: 2019-02-08 | End: 2019-02-08 | Stop reason: SURG

## 2019-02-08 RX ORDER — NALOXONE HCL 0.4 MG/ML
0.2 VIAL (ML) INJECTION AS NEEDED
Status: DISCONTINUED | OUTPATIENT
Start: 2019-02-08 | End: 2019-02-08 | Stop reason: HOSPADM

## 2019-02-08 RX ORDER — HYDROCODONE BITARTRATE AND ACETAMINOPHEN 5; 325 MG/1; MG/1
1-2 TABLET ORAL EVERY 4 HOURS PRN
Qty: 40 TABLET | Refills: 0 | Status: SHIPPED | OUTPATIENT
Start: 2019-02-08 | End: 2019-06-05

## 2019-02-08 RX ADMIN — SODIUM CHLORIDE 100 ML/HR: 9 INJECTION, SOLUTION INTRAVENOUS at 05:50

## 2019-02-08 RX ADMIN — FENTANYL CITRATE 25 MCG: 50 INJECTION, SOLUTION INTRAMUSCULAR; INTRAVENOUS at 08:13

## 2019-02-08 RX ADMIN — FENTANYL CITRATE 25 MCG: 50 INJECTION, SOLUTION INTRAMUSCULAR; INTRAVENOUS at 09:06

## 2019-02-08 RX ADMIN — PROPOFOL 150 MG: 10 INJECTION, EMULSION INTRAVENOUS at 07:45

## 2019-02-08 RX ADMIN — LIDOCAINE HYDROCHLORIDE 80 MG: 20 INJECTION, SOLUTION INFILTRATION; PERINEURAL at 07:45

## 2019-02-08 RX ADMIN — EPHEDRINE SULFATE 5 MG: 50 INJECTION INTRAVENOUS at 08:26

## 2019-02-08 RX ADMIN — MIDAZOLAM HYDROCHLORIDE 2 MG: 2 INJECTION, SOLUTION INTRAMUSCULAR; INTRAVENOUS at 07:39

## 2019-02-08 RX ADMIN — EPHEDRINE SULFATE 5 MG: 50 INJECTION INTRAVENOUS at 08:36

## 2019-02-08 RX ADMIN — HYDROMORPHONE HYDROCHLORIDE 0.5 MG: 1 INJECTION, SOLUTION INTRAMUSCULAR; INTRAVENOUS; SUBCUTANEOUS at 09:54

## 2019-02-08 RX ADMIN — FENTANYL CITRATE 25 MCG: 50 INJECTION, SOLUTION INTRAMUSCULAR; INTRAVENOUS at 08:46

## 2019-02-08 RX ADMIN — SODIUM CHLORIDE, SODIUM GLUCONATE, SODIUM ACETATE, POTASSIUM CHLORIDE, AND MAGNESIUM CHLORIDE: 526; 502; 368; 37; 30 INJECTION, SOLUTION INTRAVENOUS at 08:59

## 2019-02-08 RX ADMIN — HYDROMORPHONE HYDROCHLORIDE 0.5 MG: 1 INJECTION, SOLUTION INTRAMUSCULAR; INTRAVENOUS; SUBCUTANEOUS at 10:04

## 2019-02-08 RX ADMIN — ONDANSETRON 4 MG: 2 INJECTION INTRAMUSCULAR; INTRAVENOUS at 09:19

## 2019-02-08 RX ADMIN — CLINDAMYCIN IN 5 PERCENT DEXTROSE 900 MG: 18 INJECTION, SOLUTION INTRAVENOUS at 07:50

## 2019-02-08 RX ADMIN — DEXAMETHASONE SODIUM PHOSPHATE 4 MG: 4 INJECTION, SOLUTION INTRAMUSCULAR; INTRAVENOUS at 08:00

## 2019-02-08 NOTE — ANESTHESIA PREPROCEDURE EVALUATION
Anesthesia Evaluation     no history of anesthetic complications:  NPO Solid Status: > 8 hours  NPO Liquid Status: > 8 hours           Airway   Mallampati: II  TM distance: >3 FB  Neck ROM: full  Possible difficult intubation  Dental    (+) poor dentition        Pulmonary - normal exam    breath sounds clear to auscultation  (-) COPD, asthma, sleep apnea, not a smoker    ROS comment: snores  Cardiovascular - normal exam  Exercise tolerance: good (4-7 METS)    Rhythm: regular  Rate: normal    (+) PVD (varicose veins),   (-) hypertension, valvular problems/murmurs, dysrhythmias, angina, cardiac stents, hyperlipidemia      Neuro/Psych  (+) headaches (migraines monthly),     (-) seizures, TIA, CVA, psychiatric history  GI/Hepatic/Renal/Endo    (+) obesity,  GERD well controlled,    (-) hepatitis, liver disease, no renal disease, diabetes, hypothyroidism    Musculoskeletal     (+) back pain,   Abdominal    Substance History - negative use     OB/GYN negative ob/gyn ROS         Other   (+) arthritis (knees)     (-) history of cancer                  Anesthesia Plan    ASA 2     general     intravenous induction   Anesthetic plan, all risks, benefits, and alternatives have been provided, discussed and informed consent has been obtained with: patient and mother.

## 2019-02-08 NOTE — OP NOTE
OPERATIVE NOTE  Sara Edwards  1980 2/8/2019    PREOP DIAGNOSES:  Varicose veins RIGHT lower extremity  Varicose veins of both lower extremities with pain [I83.813]    POSTOP DIAGNOSES:    Varicose veins RIGHT lower extremity    PROCEDURE:   Stab phlebectomies RIGHT lower extremity  Endovenous ablation RIGHT greater saphenous vein (radiofrequency)    SURGEON: CARL George MD FACS RPVI    ASSISTANT: Courtney Gomes CFA    ANESTHESIA: General ET     ESTIMATED BLOOD LOSS: minimal    COMPLICATIONS: none    DESCRIPTION OF OPERATION: Patient taken to the operating room placed in supine position, general anesthesia induced. RIGHT greater saphenous vein was identified with vascular ultrasound . Cannulated at the level of the knee with mini stick under ultrasound guidance. wire and catheter exchanged for a 4Fr catheter which was then exchanged for 6Fr 7cm sheath. venefit radiofrequency catheter was inserted into the sheath up to the saphenofemoral junction. catheter was then backed off 2 cm measured by ultrasound just beyond the level of the epigastric vein. endovenous ablation of the right greater saphenous vein using radiofrequency was performed with 8RF cycles, temperature of 119°C at 11W, total ablation time 3 minutes 5 seconds. Completion ultrasound shows flow in common femoral vein, epigastric vein, no flow greater saphenous vein. varicose veins had been marked in standing position with tourniquet in same day surgery. Prepped and draped in sterile fashion. Multiple stab phlebectomies were performed in the RIGHT lower extremity for excision of painful varicose veins (18 incisions). hemostasis was obtained. incisions closed with Mastisol and Steri-Strips. leg was wrapped with snug Ace bandage from foot to groin. patient tolerated procedure well transferred to PACU in stable condition.          This document has been electronically signed by Issa George MD on February 8, 2019 9:27 AM

## 2019-02-08 NOTE — ANESTHESIA POSTPROCEDURE EVALUATION
Patient: Sara Edwards    Procedure Summary     Date:  02/08/19 Room / Location:  [facility] OR 05 / [facility] OR    Anesthesia Start:  0741 Anesthesia Stop:  0932    Procedure:  GREATER  SAPHENOUS VEIN RADIO FREQUENCY ABLATION stab phlebectomies (Right ) Diagnosis:       Varicose veins of both lower extremities with pain      (Varicose veins of both lower extremities with pain [I83.813])    Surgeon:  Issa George MD Provider:  Henry Lugo MD    Anesthesia Type:  general ASA Status:  2          Anesthesia Type: general  Last vitals  BP   108/66 (02/08/19 1029)   Temp   96.9 °F (36.1 °C) (02/08/19 1029)   Pulse   78 (02/08/19 1029)   Resp   18 (02/08/19 1029)     SpO2   100 % (02/08/19 1029)     Post Anesthesia Care and Evaluation    Patient location during evaluation: PACU  Patient participation: complete - patient participated  Level of consciousness: awake and alert  Pain management: adequate  Airway patency: patent  Anesthetic complications: No anesthetic complications  PONV Status: none  Cardiovascular status: acceptable  Respiratory status: acceptable  Hydration status: acceptable

## 2019-02-08 NOTE — ANESTHESIA PROCEDURE NOTES
Airway  Urgency: elective    Airway not difficult    General Information and Staff    Patient location during procedure: OR  CRNA: Carie Mendez CRNA    Indications and Patient Condition  Indications for airway management: airway protection    Preoxygenated: yes  Mask difficulty assessment: 0 - not attempted    Final Airway Details  Final airway type: supraglottic airway      Successful airway: I-gel  Size 4    Number of attempts at approach: 1

## 2019-02-11 LAB
LAB AP CASE REPORT: NORMAL
PATH REPORT.FINAL DX SPEC: NORMAL
PATH REPORT.GROSS SPEC: NORMAL

## 2019-02-22 ENCOUNTER — OFFICE VISIT (OUTPATIENT)
Dept: CARDIAC SURGERY | Facility: CLINIC | Age: 39
End: 2019-02-22

## 2019-02-22 VITALS
DIASTOLIC BLOOD PRESSURE: 72 MMHG | TEMPERATURE: 97.3 F | HEART RATE: 73 BPM | SYSTOLIC BLOOD PRESSURE: 112 MMHG | OXYGEN SATURATION: 98 % | WEIGHT: 198 LBS | BODY MASS INDEX: 31.82 KG/M2 | HEIGHT: 66 IN

## 2019-02-22 DIAGNOSIS — I83.813 VARICOSE VEINS OF BOTH LOWER EXTREMITIES WITH PAIN: Primary | ICD-10-CM

## 2019-02-22 PROCEDURE — 99024 POSTOP FOLLOW-UP VISIT: CPT | Performed by: NURSE PRACTITIONER

## 2019-02-22 NOTE — PATIENT INSTRUCTIONS
Care after stab phlebectomy procedure includes increasing activity with walking and exercises that work your calf and leg muscles as these help foster venous return.  When inactive, it is important that you elevate the procedure leg, inactivity with your leg in a dependent position will increase swelling and discomfort.  Continue an aspirin regimen unless otherwise directed, one week post procedure you may discontinue the use of narcotic pain medication and substitute with tylenol or ibuprofen as needed.  Do not submerge in tub baths or swim for two weeks.  Continue to use compression stockings to improve venous return.  Monitor stab sites: reasons to notify heart and vascular center include pus like drainage from sites, red streaked lines on procedure leg, or increased pain.

## 2019-02-28 PROBLEM — Z48.812 SURGICAL AFTERCARE, CIRCULATORY SYSTEM: Status: ACTIVE | Noted: 2019-02-28

## 2019-02-28 NOTE — PROGRESS NOTES
CVTS Office Progress Note     Subjective   Patient ID: Sara Edwards is a 38 y.o. female is here today for follow-up PO stab phlebectomy    Chief Complaint:    Chief Complaint   Patient presents with   • Varicose Veins     post op RIGHT leg stab phlebectomies, 2/8/2019       PCP:  Odalis Borja APRN     History of Present Illness  38 y.o. female with HTN(stable, increased risk stroke, rupture), Hyperlipidemia(stable, increased risk cardiovascular events) and Obesity(uncontrolled, increased risk cardiovascular events) , varicose veins(new, increase risk venous stasis).  former smoker.  Moderate pain itching RIGHT lower extremity thigh to knee x 15yrs.  No swelling.  Has used compression stockings causes worse pain..  No TIA stroke amaurosis. No MI claudication. No other associated signs, symptoms or modifying factors.  Follows up today PO week one Stab phlebectomy GSV ablation.       1/2019 Lower extremity venous duplex:  RIGHT no dvt.  Reflux GSV above knee.  Multiple varicosities thigh.  Bulbous area 15mm at knee.  LEFT no dvt.  No reflux.       The following portions of the patient's history were reviewed and updated as appropriate: allergies, current medications, past family history, past medical history, past social history, past surgical history and problem list.  Recent images independently reviewed.  Available laboratory values reviewed.        Past Medical History:   Diagnosis Date   • Bilateral foot pain    • Callus    • Chronic right shoulder pain    • Ganglion cyst of left foot    • GERD (gastroesophageal reflux disease)    • Ingrown toenail    • Migraine    • Obesity    • Plantar fasciitis    • Varicose vein of leg      Past Surgical History:   Procedure Laterality Date   • CARPAL TUNNEL RELEASE Right 2015   • KNEE SURGERY Right    • LAPAROSCOPIC TUBAL LIGATION  2008   • VARICOSE VEIN SURGERY Right 2/8/2019    Procedure: GREATER  SAPHENOUS VEIN RADIO FREQUENCY ABLATION stab phlebectomies;   Surgeon: Issa George MD;  Location: [facility] OR;  Service: Vascular     Family History   Problem Relation Age of Onset   • Heart disease Mother    • Cancer Mother    • Diabetes Father    • Hypertension Father    • Hyperlipidemia Father    • Heart disease Maternal Grandmother      Social History     Tobacco Use   • Smoking status: Former Smoker   • Smokeless tobacco: Never Used   Substance Use Topics   • Alcohol use: Yes     Comment: socially   • Drug use: No       ALLERGIES:   Penicillins and Sulfa antibiotics    MEDICATIONS:      Current Outpatient Medications:   •  Apple Cider Vinegar 500 MG tablet, Take 1 tablet by mouth 3 (Three) Times a Day., Disp: , Rfl:   •  Calcium Polycarbophil (FIBER-CAPS PO), Take 2 capsules by mouth Daily., Disp: , Rfl:   •  Cholecalciferol (VITAMIN D) 2000 units tablet, Take 2,000 Units by mouth Daily., Disp: 30 tablet, Rfl: 11  •  Cinnamon 500 MG tablet, Take 2 tablets by mouth Daily., Disp: , Rfl:   •  COLLAGEN PO, Take 1 tablet by mouth 3 (Three) Times a Day., Disp: , Rfl:   •  HYDROcodone-acetaminophen (NORCO) 5-325 MG per tablet, Take 1-2 tablets by mouth Every 4 (Four) Hours As Needed for Pain., Disp: 40 tablet, Rfl: 0  •  MAGNESIUM PO, Take 1 tablet by mouth Daily., Disp: , Rfl:   •  meloxicam (MOBIC) 15 MG tablet, Take 15 mg by mouth Daily As Needed., Disp: , Rfl:   •  Potassium 99 MG tablet, Take 1 tablet by mouth Daily., Disp: , Rfl:   •  rizatriptan MLT (MAXALT-MLT) 10 MG disintegrating tablet, Take 1 tablet by mouth 1 (One) Time As Needed for Migraine for up to 1 dose. May repeat in 2 hours if needed, Disp: 9 tablet, Rfl: 2  •  SUPER B COMPLEX/C PO, Take 1 tablet by mouth Every Other Day., Disp: , Rfl:     Review of Systems   Constitution: Negative for chills, decreased appetite, fever, weakness and weight loss.   HENT: Negative for congestion, nosebleeds and sore throat.    Eyes: Negative for blurred vision, visual disturbance and visual halos.   Cardiovascular:  Positive for leg swelling (Mild). Negative for chest pain and dyspnea on exertion.   Respiratory: Negative for cough, shortness of breath, sputum production and wheezing.    Endocrine: Negative for cold intolerance and polyuria.   Hematologic/Lymphatic: Negative for bleeding problem. Does not bruise/bleed easily.   Skin: Positive for color change. Negative for flushing, nail changes and unusual hair distribution.   Musculoskeletal: Negative for arthritis, back pain and joint pain.   Gastrointestinal: Negative for bloating, abdominal pain, hematemesis, melena, nausea and vomiting.   Genitourinary: Negative for flank pain and hematuria.   Neurological: Negative for brief paralysis, difficulty with concentration, focal weakness, light-headedness, loss of balance, numbness and paresthesias.   Psychiatric/Behavioral: Negative for altered mental status, depression, substance abuse and suicidal ideas.   Allergic/Immunologic: Negative for hives and persistent infections.     Vitals:    02/22/19 1021   BP: 112/72   Pulse: 73   Temp: 97.3 °F (36.3 °C)   SpO2: 98%      Objective      Body mass index is 31.96 kg/m².  Physical Exam   Constitutional: She is oriented to person, place, and time. She appears well-developed and well-nourished.   HENT:   Head: Normocephalic and atraumatic.   Mouth/Throat: Oropharynx is clear and moist.   Eyes: Conjunctivae and EOM are normal. Pupils are equal, round, and reactive to light.   Neck: Normal range of motion. Neck supple. No JVD present.   Cardiovascular: Normal rate, regular rhythm, normal heart sounds and intact distal pulses.   Pulmonary/Chest: Effort normal and breath sounds normal. No stridor. No respiratory distress.   Abdominal: Soft. Bowel sounds are normal. She exhibits no distension. There is no tenderness.   Genitourinary:   Genitourinary Comments: deferred   Musculoskeletal: Normal range of motion. She exhibits edema (RLE +1) and tenderness (RLE).   Lymphadenopathy:     She has  no cervical adenopathy.   Neurological: She is alert and oriented to person, place, and time. No cranial nerve deficit. Coordination normal.   Skin: Skin is warm and dry. Capillary refill takes less than 2 seconds.   Stab sites CDI RLE   Psychiatric: She has a normal mood and affect. Her behavior is normal. Judgment normal.   Nursing note and vitals reviewed.        Assessment & Plan     Independent Review of Studies    PO Stab Phlebecomy  Care after stab phlebectomy procedure includes increasing activity with walking and exercises that work your calf and leg muscles as these help foster venous return.  When inactive, it is important that you elevate the procedure leg, inactivity with your leg in a dependent position will increase swelling and discomfort.  Continue an aspirin regimen unless otherwise directed, one week post procedure you may discontinue the use of narcotic pain medication and substitute with tylenol or ibuprofen as needed.  Do not submerge in tub baths or swim for two weeks.  Continue to use compression stockings to improve venous return.  Monitor stab sites: reasons to notify heart and vascular center include pus like drainage from sites, red streaked lines on procedure leg, or increased pain.      Detailed discussion regarding risks, benefits, and treatment plan. Images independently reviewed. Patient understands, agrees, and wishes to proceed with plan.

## 2019-06-05 ENCOUNTER — OFFICE VISIT (OUTPATIENT)
Dept: FAMILY MEDICINE CLINIC | Facility: CLINIC | Age: 39
End: 2019-06-05

## 2019-06-05 VITALS
WEIGHT: 209 LBS | HEIGHT: 66 IN | OXYGEN SATURATION: 98 % | SYSTOLIC BLOOD PRESSURE: 102 MMHG | TEMPERATURE: 99.6 F | BODY MASS INDEX: 33.59 KG/M2 | HEART RATE: 99 BPM | DIASTOLIC BLOOD PRESSURE: 76 MMHG

## 2019-06-05 DIAGNOSIS — M25.531 RIGHT WRIST PAIN: ICD-10-CM

## 2019-06-05 DIAGNOSIS — M62.838 MUSCLE SPASM: Primary | ICD-10-CM

## 2019-06-05 PROCEDURE — 99214 OFFICE O/P EST MOD 30 MIN: CPT | Performed by: NURSE PRACTITIONER

## 2019-06-05 RX ORDER — METHYLPREDNISOLONE 4 MG/1
TABLET ORAL
Qty: 1 EACH | Refills: 0 | Status: SHIPPED | OUTPATIENT
Start: 2019-06-05 | End: 2019-10-18

## 2019-06-05 RX ORDER — CYCLOBENZAPRINE HCL 5 MG
5 TABLET ORAL NIGHTLY PRN
Qty: 14 TABLET | Refills: 0 | Status: SHIPPED | OUTPATIENT
Start: 2019-06-05 | End: 2019-10-24

## 2019-06-05 NOTE — PROGRESS NOTES
"Subjective   Sara Edwards is a 38 y.o. female.     [facility] Visit    PCP: DELPHINE Kanh    CC: \"Pain in right wrist and left shoulder\"      Shoulder Injury    The incident occurred at home. The left shoulder is affected. The incident occurred 3 to 5 days ago. Injury mechanism: started after she dug/lifted 9 fence posts out of the ground. The quality of the pain is described as aching and cramping. The pain radiates to the left neck. The pain is at a severity of 8/10. Associated symptoms include numbness ( right hand/fingers --chronic since carpal tunnel surgery) and tingling ( right hand/fingers --chronic since carpal tunnel surgery). Pertinent negatives include no chest pain or muscle weakness. The symptoms are aggravated by movement, overhead lifting and palpation. She has tried rest for the symptoms. The treatment provided no relief.   Wrist Pain    The pain is present in the right wrist. This is a new problem. The current episode started 1 to 4 weeks ago. There has been no history of extremity trauma (does do repetitive lifting, stocking). The problem occurs daily. The problem has been unchanged. The quality of the pain is described as aching. The pain is mild. Associated symptoms include numbness ( right hand/fingers --chronic since carpal tunnel surgery) and tingling ( right hand/fingers --chronic since carpal tunnel surgery). Pertinent negatives include no fever, inability to bear weight, itching, joint locking, joint swelling, limited range of motion or stiffness. She has tried nothing for the symptoms. Her past medical history is significant for osteoarthritis.        The following portions of the patient's history were reviewed and updated as appropriate: allergies, current medications, past medical history, past social history, past surgical history and problem list.    Review of Systems   Constitutional: Negative for appetite change, diaphoresis, fatigue and fever.   Respiratory: " "Negative for cough, chest tightness, shortness of breath and wheezing.    Cardiovascular: Negative for chest pain, palpitations and leg swelling.   Gastrointestinal: Negative for abdominal pain, diarrhea, nausea and vomiting.   Genitourinary: Negative for difficulty urinating.   Musculoskeletal: Negative for stiffness.        +right wrist pain  +left shoulder/neck pain     Skin: Negative for itching, rash and bruise.   Neurological: Positive for tingling ( right hand/fingers --chronic since carpal tunnel surgery) and numbness ( right hand/fingers --chronic since carpal tunnel surgery). Negative for headache.     /76 (BP Location: Right arm, Patient Position: Sitting, Cuff Size: Adult)   Pulse 99   Temp 99.6 °F (37.6 °C) (Tympanic)   Ht 167.6 cm (66\")   Wt 94.8 kg (209 lb)   SpO2 98%   BMI 33.73 kg/m²     Objective   Physical Exam   Constitutional: She is oriented to person, place, and time. She appears well-developed and well-nourished. No distress.   Cardiovascular: Normal rate and regular rhythm.   Pulmonary/Chest: Effort normal and breath sounds normal. She has no wheezes. She has no rales.   Musculoskeletal:        Left shoulder: She exhibits decreased range of motion (due to pain), tenderness, spasm and decreased strength. She exhibits normal pulse.        Right wrist: She exhibits decreased range of motion ( slightly) and tenderness. She exhibits no swelling and no deformity.        Arms:  Neurological: She is alert and oriented to person, place, and time.   Skin: Skin is warm and dry. No rash noted. No erythema.   Nursing note and vitals reviewed.    No results found for this or any previous visit (from the past 24 hour(s)).  No Images in the past 120 days found..      Assessment/Plan   Sara was seen today for wrist pain and shoulder pain.    Diagnoses and all orders for this visit:    Muscle spasm  Comments:  left neck and shoulder  Orders:  -     cyclobenzaprine (FLEXERIL) 5 MG tablet; Take " 1 tablet by mouth At Night As Needed for Muscle Spasms.    Right wrist pain  -     methylPREDNISolone (MEDROL, ASTRID,) 4 MG tablet; Take as directed on package instructions.      Declines xrays at this time  Rx for Medrol astrid, Flexeril at bedtime  Has Mobic at home--begin daily for the next 10-14 days  Wrist splint provided in office ([employer])--wear while working and at night if possible  Alternate ice/heat as needed to left shoulder/neck area    No overhead lifting x 2 weeks--RTW note provided    If no improvement with above measures, will notify me to obtain xrays and possible ortho referral    Patient's Body mass index is 33.73 kg/m². BMI is above normal parameters. Recommendations include: referral to primary care     See PCP or RTC if symptoms persist/worsen  See PCP for routine f/u visit and management of chronic medical conditions    .

## 2019-06-05 NOTE — PATIENT INSTRUCTIONS
Wrist Pain, Adult  There are many things that can cause wrist pain. Some common causes include:  · An injury to the wrist area.  · Overuse of the joint.  · A condition that causes too much pressure to be put on a nerve in the wrist (carpal tunnel syndrome).  · Wear and tear of the joints that happens as a person gets older (osteoarthritis).  · Other types of arthritis.    Sometimes, the cause of wrist pain is not known. Often, the pain goes away when you follow your doctor’s instructions for helping pain at home, such as resting or icing your wrist. If your wrist pain does not go away, it is important to tell your doctor.  Follow these instructions at home:  · Rest the wrist area for 48 hours or more, or as long as told by your doctor.  · If a splint or elastic bandage has been put on your wrist, use it as told by your doctor.  ? Take off the splint or bandage only as told by your doctor.  ? Loosen the splint or bandage if your fingers tingle, lose feeling (get numb), or turn cold or blue.  · If directed, apply ice to the injured area:  ? If you have a removable splint or elastic bandage, remove it as told by your doctor.  ? Put ice in a plastic bag.  ? Place a towel between your skin and the bag or between your splint or bandage and the bag.  ? Leave the ice on for 20 minutes, 2-3 times a day.  · Keep your arm raised (elevated) above the level of your heart while you are sitting or lying down.  · Take over-the-counter and prescription medicines only as told by your doctor.  · Keep all follow-up visits as told by your doctor. This is important.  Contact a doctor if:  · You have a sudden sharp pain in the wrist, hand, or arm that is different or new.  · The swelling or bruising on your wrist or hand gets worse.  · Your skin becomes red, gets a rash, or has open sores.  · Your pain does not get better or it gets worse.  Get help right away if:  · You lose feeling in your fingers or hand.  · Your fingers turn white,  very red, or cold and blue.  · You cannot move your fingers.  · You have a fever or chills.  This information is not intended to replace advice given to you by your health care provider. Make sure you discuss any questions you have with your health care provider.  Document Released: 06/05/2009 Document Revised: 07/13/2017 Document Reviewed: 07/06/2017  [employer] Patient Education © 2019 [employer]  Muscle Cramps and Spasms  Muscle cramps and spasms occur when a muscle or muscles tighten and you have no control over this tightening (involuntary muscle contraction). They are a common problem and can develop in any muscle. The most common place is in the calf muscles of the leg. Muscle cramps and muscle spasms are both involuntary muscle contractions, but there are some differences between the two:  · Muscle cramps are painful. They come and go and may last a few seconds to 15 minutes. Muscle cramps are often more forceful and last longer than muscle spasms.  · Muscle spasms may or may not be painful. They may also last just a few seconds or much longer.    Certain medical conditions, such as diabetes or Parkinson disease, can make it more likely to develop cramps or spasms. However, cramps or spasms are usually not caused by a serious underlying problem. Common causes include:  · Overexertion.  · Overuse from repetitive motions, or doing the same thing over and over.  · Remaining in a certain position for a long period of time.  · Improper preparation, form, or technique while playing a sport or doing an activity.  · Dehydration.  · Injury.  · Side effects of some medicines.  · Abnormally low levels of the salts and ions in your blood (electrolytes), especially potassium and calcium. This could happen if you are taking water pills (diuretics) or if you are pregnant.    In many cases, the cause of muscle cramps or spasms is unknown.  Follow these instructions at home:  · Stay well hydrated. Drink enough  fluid to keep your urine clear or pale yellow.  · Try massaging, stretching, and relaxing the affected muscle.  · If directed, apply heat to tight or tense muscles as often as told by your health care provider. Use the heat source that your health care provider recommends, such as a moist heat pack or a heating pad.  ? Place a towel between your skin and the heat source.  ? Leave the heat on for 20-30 minutes.  ? Remove the heat if your skin turns bright red. This is especially important if you are unable to feel pain, heat, or cold. You may have a greater risk of getting burned.  · If directed, put ice on the affected area. This may help if you are sore or have pain after a cramp or spasm.  ? Put ice in a plastic bag.  ? Place a towel between your skin and the bag.  ? Leave the ice on for 20 minutes, 2-3 times a day.  · Take over-the-counter and prescription medicines only as told by your health care provider.  · Pay attention to any changes in your symptoms.  Contact a health care provider if:  · Your cramps or spasms get more severe or happen more often.  · Your cramps or spasms do not improve over time.  This information is not intended to replace advice given to you by your health care provider. Make sure you discuss any questions you have with your health care provider.  Document Released: 06/09/2003 Document Revised: 01/18/2017 Document Reviewed: 09/20/2016  [employer] Interactive Patient Education © 2019 [employer]

## 2019-10-18 ENCOUNTER — OFFICE VISIT (OUTPATIENT)
Dept: FAMILY MEDICINE CLINIC | Facility: CLINIC | Age: 39
End: 2019-10-18

## 2019-10-18 VITALS
BODY MASS INDEX: 34.61 KG/M2 | WEIGHT: 215.38 LBS | OXYGEN SATURATION: 99 % | DIASTOLIC BLOOD PRESSURE: 86 MMHG | TEMPERATURE: 97.6 F | SYSTOLIC BLOOD PRESSURE: 112 MMHG | HEIGHT: 66 IN | HEART RATE: 72 BPM | RESPIRATION RATE: 20 BRPM

## 2019-10-18 DIAGNOSIS — R21 RASH: ICD-10-CM

## 2019-10-18 DIAGNOSIS — J01.00 ACUTE MAXILLARY SINUSITIS, RECURRENCE NOT SPECIFIED: ICD-10-CM

## 2019-10-18 DIAGNOSIS — M25.531 RIGHT WRIST PAIN: Primary | ICD-10-CM

## 2019-10-18 RX ORDER — METHYLPREDNISOLONE 4 MG/1
TABLET ORAL
Qty: 1 EACH | Refills: 0 | Status: SHIPPED | OUTPATIENT
Start: 2019-10-18 | End: 2019-10-24

## 2019-10-18 RX ORDER — CLARITHROMYCIN 500 MG/1
500 TABLET, COATED ORAL 2 TIMES DAILY
Qty: 20 TABLET | Refills: 0 | Status: SHIPPED | OUTPATIENT
Start: 2019-10-18 | End: 2019-10-28

## 2019-10-18 NOTE — PROGRESS NOTES
"Subjective   Sara Edwards is a 38 y.o. female.     [facility] Walk in Clinic Visit    PCP: DELPHINE Kahn    CC: \"hand, sharp pains, sinus issues and possible rash\"      Wrist Pain    The pain is present in the right wrist (radiates to hand and elbow). This is a recurrent problem. The current episode started more than 1 year ago (history of carpal tunnel surgery 4 years ago in ; had problems with wrist in June and treated with Medrol and wrist brace which helped, but started bothering her again a few weeks ago and brace isn't helping much.  Works in a [employer], does repetitive work). There has been no history of extremity trauma. The problem occurs daily. The problem has been gradually worsening. The pain is at a severity of 1/10. Associated symptoms include joint locking (reports fingers lock up at times), numbness ( at times) and tingling ( at times). Pertinent negatives include no fever, inability to bear weight, itching, joint swelling, limited range of motion or stiffness. Treatments tried: brace. The treatment provided no relief.   Sinusitis   This is a new problem. The current episode started more than 1 month ago. The problem has been gradually worsening since onset. There has been no fever. She is experiencing no pain (pressure). Associated symptoms include congestion, coughing ( occasional), headaches ( occaisonal), sinus pressure (maxillary) and a sore throat (in the AM's ). Pertinent negatives include no chills, diaphoresis, ear pain, hoarse voice, neck pain, shortness of breath, sneezing or swollen glands. Treatments tried: naproxen for the headaches. The treatment provided mild relief.   Rash   This is a new problem. The current episode started in the past 7 days. The problem is unchanged. Location: left arm. The rash is characterized by itchiness. She was exposed to nothing. Associated symptoms include congestion, coughing ( occasional), rhinorrhea (now yellow) and a sore throat (in the AM's ). " "Pertinent negatives include no anorexia, diarrhea, eye pain, facial edema, fatigue, fever, joint pain, nail changes, shortness of breath or vomiting. Treatments tried: lotions. The treatment provided no relief.        The following portions of the patient's history were reviewed and updated as appropriate: allergies, current medications, past medical history, past social history, past surgical history and problem list.    Review of Systems   Constitutional: Negative for appetite change, chills, diaphoresis, fatigue and fever.   HENT: Positive for congestion, postnasal drip ( thick), rhinorrhea (now yellow), sinus pressure (maxillary) and sore throat (in the AM's ). Negative for ear discharge, ear pain, hoarse voice, sneezing and swollen glands.    Eyes: Negative for pain and discharge.   Respiratory: Positive for cough ( occasional). Negative for chest tightness, shortness of breath and wheezing.    Cardiovascular: Negative.    Gastrointestinal: Negative for anorexia, diarrhea, nausea and vomiting.   Genitourinary: Negative for difficulty urinating.   Musculoskeletal: Positive for arthralgias (right wrist pain). Negative for joint pain, neck pain and stiffness.   Skin: Positive for rash. Negative for itching and nail changes.   Neurological: Positive for tingling ( at times), numbness ( at times) and headache ( occasional). Negative for dizziness.     /86 (BP Location: Right arm, Patient Position: Sitting, Cuff Size: Large Adult)   Pulse 72   Temp 97.6 °F (36.4 °C) (Oral)   Resp 20   Ht 167.6 cm (66\")   Wt 97.7 kg (215 lb 6 oz)   LMP 10/05/2019   SpO2 99%   Breastfeeding? No   BMI 34.76 kg/m²     Objective   Physical Exam   Constitutional: She is oriented to person, place, and time. She appears well-developed and well-nourished. No distress.   HENT:   Head: Normocephalic and atraumatic.   Right Ear: Ear canal normal. Tympanic membrane is not erythematous ( dull).   Left Ear: Ear canal normal. Tympanic " membrane is not erythematous ( dull).   Nose: Mucosal edema and congestion present. Right sinus exhibits maxillary sinus tenderness. Right sinus exhibits no frontal sinus tenderness. Left sinus exhibits maxillary sinus tenderness. Left sinus exhibits no frontal sinus tenderness.   Mouth/Throat: Uvula is midline and mucous membranes are normal. Posterior oropharyngeal erythema ( mild injection with PND) present. No oropharyngeal exudate.   Eyes: Conjunctivae are normal. Right eye exhibits no discharge. Left eye exhibits no discharge.   Neck: Neck supple.   Cardiovascular: Normal rate and regular rhythm.   Pulmonary/Chest: Effort normal and breath sounds normal.   Musculoskeletal:        Right wrist: She exhibits tenderness. She exhibits normal range of motion, no swelling and no deformity.        Arms:  Normal      Lymphadenopathy:     She has no cervical adenopathy.   Neurological: She is alert and oriented to person, place, and time.   Skin: Rash noted.        Nursing note and vitals reviewed.    No results found for this or any previous visit (from the past 24 hour(s)).  No Images in the past 120 days found..      Assessment/Plan   Diagnoses and all orders for this visit:    Right wrist pain  -     XR Wrist 3+ View Right (In Office)  -     Ambulatory Referral to Orthopedic Surgery  -     methylPREDNISolone (MEDROL, ASTRID,) 4 MG tablet; Take as directed on package instructions.    Acute maxillary sinusitis, recurrence not specified  -     clarithromycin (BIAXIN) 500 MG tablet; Take 1 tablet by mouth 2 (Two) Times a Day.  -     methylPREDNISolone (MEDROL, ASTRID,) 4 MG tablet; Take as directed on package instructions.    Rash  -     methylPREDNISolone (MEDROL, ASTRID,) 4 MG tablet; Take as directed on package instructions.      Xrays of wrist today on the way out--will call with results when available  Referral to Ortho due to recurring wrist pain, history of surgery--she is to sign for medical records from  where she  had surgery prior to leaving today  Continue with wrist splint  Ice as needed  Rx for Medrol lesley  Naproxen or Motrin as needed    Rx for Biaxin, Medrol for sinusitis  Recommended sinus rinses twice daily    Keep skin well moisturized  Medrol should help with rash if it is a contact reaction    Patient's Body mass index is 34.76 kg/m². BMI is above normal parameters. Recommendations include: referral to primary care.    See PCP or RTC if symptoms persist/worsen  See PCP for routine f/u visit and management of chronic medical conditions

## 2019-10-24 ENCOUNTER — OFFICE VISIT (OUTPATIENT)
Dept: FAMILY MEDICINE CLINIC | Facility: CLINIC | Age: 39
End: 2019-10-24

## 2019-10-24 VITALS
HEART RATE: 81 BPM | OXYGEN SATURATION: 98 % | WEIGHT: 214 LBS | RESPIRATION RATE: 20 BRPM | DIASTOLIC BLOOD PRESSURE: 60 MMHG | BODY MASS INDEX: 34.39 KG/M2 | TEMPERATURE: 98.1 F | HEIGHT: 66 IN | SYSTOLIC BLOOD PRESSURE: 110 MMHG

## 2019-10-24 DIAGNOSIS — Z00.00 WELL ADULT EXAM: Primary | ICD-10-CM

## 2019-10-24 DIAGNOSIS — Z13.29 SCREENING FOR THYROID DISORDER: ICD-10-CM

## 2019-10-24 DIAGNOSIS — Z13.220 SCREENING FOR LIPOID DISORDERS: ICD-10-CM

## 2019-10-24 DIAGNOSIS — E66.9 OBESITY (BMI 30-39.9): ICD-10-CM

## 2019-10-24 DIAGNOSIS — Z13.1 SCREENING FOR DIABETES MELLITUS: ICD-10-CM

## 2019-10-24 DIAGNOSIS — E55.9 VITAMIN D DEFICIENCY: ICD-10-CM

## 2019-10-24 PROCEDURE — 99396 PREV VISIT EST AGE 40-64: CPT | Performed by: NURSE PRACTITIONER

## 2019-10-24 RX ORDER — CHOLECALCIFEROL (VITAMIN D3) 50 MCG
2000 TABLET ORAL DAILY
Qty: 30 TABLET | Refills: 11 | Status: SHIPPED | OUTPATIENT
Start: 2019-10-24 | End: 2021-01-08 | Stop reason: CLARIF

## 2019-10-25 ENCOUNTER — APPOINTMENT (OUTPATIENT)
Dept: LAB | Facility: HOSPITAL | Age: 39
End: 2019-10-25

## 2019-10-25 DIAGNOSIS — M79.641 RIGHT HAND PAIN: Primary | ICD-10-CM

## 2019-10-25 LAB
25(OH)D3 SERPL-MCNC: 19.6 NG/ML (ref 30–100)
ALBUMIN SERPL-MCNC: 4.2 G/DL (ref 3.5–5.2)
ALBUMIN/GLOB SERPL: 1.2 G/DL
ALP SERPL-CCNC: 39 U/L (ref 39–117)
ALT SERPL W P-5'-P-CCNC: 9 U/L (ref 1–33)
ANION GAP SERPL CALCULATED.3IONS-SCNC: 11 MMOL/L (ref 5–15)
AST SERPL-CCNC: 10 U/L (ref 1–32)
BASOPHILS # BLD AUTO: 0.07 10*3/MM3 (ref 0–0.2)
BASOPHILS NFR BLD AUTO: 0.5 % (ref 0–1.5)
BILIRUB SERPL-MCNC: 0.3 MG/DL (ref 0.2–1.2)
BUN BLD-MCNC: 17 MG/DL (ref 6–20)
BUN/CREAT SERPL: 19.8 (ref 7–25)
CALCIUM SPEC-SCNC: 9.4 MG/DL (ref 8.6–10.5)
CHLORIDE SERPL-SCNC: 99 MMOL/L (ref 98–107)
CHOLEST SERPL-MCNC: 198 MG/DL (ref 0–200)
CO2 SERPL-SCNC: 25 MMOL/L (ref 22–29)
CREAT BLD-MCNC: 0.86 MG/DL (ref 0.57–1)
DEPRECATED RDW RBC AUTO: 38.1 FL (ref 37–54)
EOSINOPHIL # BLD AUTO: 0.18 10*3/MM3 (ref 0–0.4)
EOSINOPHIL NFR BLD AUTO: 1.2 % (ref 0.3–6.2)
ERYTHROCYTE [DISTWIDTH] IN BLOOD BY AUTOMATED COUNT: 12.4 % (ref 12.3–15.4)
GFR SERPL CREATININE-BSD FRML MDRD: 73 ML/MIN/1.73
GLOBULIN UR ELPH-MCNC: 3.5 GM/DL
GLUCOSE BLD-MCNC: 92 MG/DL (ref 65–99)
HBA1C MFR BLD: 5.6 % (ref 4.8–5.6)
HCT VFR BLD AUTO: 43.8 % (ref 34–46.6)
HDLC SERPL-MCNC: 78 MG/DL (ref 40–60)
HGB BLD-MCNC: 15.2 G/DL (ref 12–15.9)
IMM GRANULOCYTES # BLD AUTO: 0.1 10*3/MM3 (ref 0–0.05)
IMM GRANULOCYTES NFR BLD AUTO: 0.7 % (ref 0–0.5)
LDLC SERPL CALC-MCNC: 108 MG/DL (ref 0–100)
LDLC/HDLC SERPL: 1.38 {RATIO}
LYMPHOCYTES # BLD AUTO: 4.55 10*3/MM3 (ref 0.7–3.1)
LYMPHOCYTES NFR BLD AUTO: 31.1 % (ref 19.6–45.3)
MCH RBC QN AUTO: 29.8 PG (ref 26.6–33)
MCHC RBC AUTO-ENTMCNC: 34.7 G/DL (ref 31.5–35.7)
MCV RBC AUTO: 85.9 FL (ref 79–97)
MONOCYTES # BLD AUTO: 0.82 10*3/MM3 (ref 0.1–0.9)
MONOCYTES NFR BLD AUTO: 5.6 % (ref 5–12)
NEUTROPHILS # BLD AUTO: 8.93 10*3/MM3 (ref 1.7–7)
NEUTROPHILS NFR BLD AUTO: 60.9 % (ref 42.7–76)
NRBC BLD AUTO-RTO: 0 /100 WBC (ref 0–0.2)
PLATELET # BLD AUTO: 373 10*3/MM3 (ref 140–450)
PMV BLD AUTO: 11.7 FL (ref 6–12)
POTASSIUM BLD-SCNC: 4.6 MMOL/L (ref 3.5–5.2)
PROT SERPL-MCNC: 7.7 G/DL (ref 6–8.5)
RBC # BLD AUTO: 5.1 10*6/MM3 (ref 3.77–5.28)
SODIUM BLD-SCNC: 135 MMOL/L (ref 136–145)
TRIGL SERPL-MCNC: 61 MG/DL (ref 0–150)
TSH SERPL DL<=0.05 MIU/L-ACNC: 2.81 UIU/ML (ref 0.27–4.2)
VLDLC SERPL-MCNC: 12.2 MG/DL (ref 5–40)
WBC NRBC COR # BLD: 14.65 10*3/MM3 (ref 3.4–10.8)

## 2019-10-25 PROCEDURE — 80053 COMPREHEN METABOLIC PANEL: CPT | Performed by: NURSE PRACTITIONER

## 2019-10-25 PROCEDURE — 82306 VITAMIN D 25 HYDROXY: CPT | Performed by: NURSE PRACTITIONER

## 2019-10-25 PROCEDURE — 85025 COMPLETE CBC W/AUTO DIFF WBC: CPT | Performed by: NURSE PRACTITIONER

## 2019-10-25 PROCEDURE — 84443 ASSAY THYROID STIM HORMONE: CPT | Performed by: NURSE PRACTITIONER

## 2019-10-25 PROCEDURE — 83036 HEMOGLOBIN GLYCOSYLATED A1C: CPT | Performed by: NURSE PRACTITIONER

## 2019-10-25 PROCEDURE — 80061 LIPID PANEL: CPT | Performed by: NURSE PRACTITIONER

## 2019-10-28 ENCOUNTER — OFFICE VISIT (OUTPATIENT)
Dept: ORTHOPEDIC SURGERY | Facility: CLINIC | Age: 39
End: 2019-10-28

## 2019-10-28 VITALS — BODY MASS INDEX: 34.55 KG/M2 | WEIGHT: 215 LBS | HEIGHT: 66 IN

## 2019-10-28 DIAGNOSIS — M25.531 RIGHT WRIST PAIN: Primary | ICD-10-CM

## 2019-10-28 PROCEDURE — 99214 OFFICE O/P EST MOD 30 MIN: CPT | Performed by: NURSE PRACTITIONER

## 2019-10-28 NOTE — PROGRESS NOTES
Sara Edwards is a 38 y.o. female   Primary provider:  Odalis Borja APRN       Chief Complaint   Patient presents with   • Right Wrist - Wrist Pain       HISTORY OF PRESENT ILLNESS:    Wrist Pain    The pain is present in the right wrist. This is a new (patient states pain started about 4 months ago after taking up a fence post. ) problem. The current episode started more than 1 month ago. There has been a history of trauma. The problem occurs intermittently. The problem has been unchanged. The quality of the pain is described as aching (stabbing. ). The pain is moderate. Associated symptoms include numbness. Associated symptoms comments: Clicking, popping. . She has tried rest and NSAIDS (xrays done on 10/18/2019) for the symptoms.        CONCURRENT MEDICAL HISTORY:    Past Medical History:   Diagnosis Date   • Bilateral foot pain    • Callus    • Chronic right shoulder pain    • Ganglion cyst of left foot    • GERD (gastroesophageal reflux disease)    • Ingrown toenail    • Migraine    • Obesity    • Plantar fasciitis    • Varicose vein of leg        Allergies   Allergen Reactions   • Penicillins Anaphylaxis   • Sulfa Antibiotics Other (See Comments)     blisters         Current Outpatient Medications:   •  Apple Cider Vinegar 500 MG tablet, Take 1 tablet by mouth 3 (Three) Times a Day., Disp: , Rfl:   •  Calcium Polycarbophil (FIBER-CAPS PO), Take 2 capsules by mouth Daily., Disp: , Rfl:   •  Cholecalciferol (VITAMIN D) 50 MCG (2000 UT) tablet, Take 2,000 Units by mouth Daily., Disp: 30 tablet, Rfl: 11  •  Cinnamon 500 MG tablet, Take 2 tablets by mouth Daily., Disp: , Rfl:   •  COLLAGEN PO, Take 1 tablet by mouth 3 (Three) Times a Day., Disp: , Rfl:   •  MAGNESIUM PO, Take 1 tablet by mouth Daily., Disp: , Rfl:   •  meloxicam (MOBIC) 15 MG tablet, Take 15 mg by mouth Daily As Needed., Disp: , Rfl:   •  Potassium 99 MG tablet, Take 1 tablet by mouth Daily., Disp: , Rfl:   •  rizatriptan MLT (MAXALT-MLT)  "10 MG disintegrating tablet, Take 1 tablet by mouth 1 (One) Time As Needed for Migraine for up to 1 dose. May repeat in 2 hours if needed, Disp: 9 tablet, Rfl: 2  •  SUPER B COMPLEX/C PO, Take 1 tablet by mouth Every Other Day., Disp: , Rfl:     Past Surgical History:   Procedure Laterality Date   • CARPAL TUNNEL RELEASE Right 2015   • KNEE SURGERY Right    • LAPAROSCOPIC TUBAL LIGATION  2008   • VARICOSE VEIN SURGERY Right 2/8/2019    Procedure: GREATER  SAPHENOUS VEIN RADIO FREQUENCY ABLATION stab phlebectomies;  Surgeon: Issa George MD;  Location: [facility] OR;  Service: Vascular       Family History   Problem Relation Age of Onset   • Heart disease Mother    • Cancer Mother    • Diabetes Father    • Hypertension Father    • Hyperlipidemia Father    • Heart disease Maternal Grandmother         Social History     Socioeconomic History   • Marital status: Other     Spouse name: Not on file   • Number of children: Not on file   • Years of education: Not on file   • Highest education level: Not on file   Tobacco Use   • Smoking status: Former Smoker   • Smokeless tobacco: Never Used   Substance and Sexual Activity   • Alcohol use: Yes     Comment: socially   • Drug use: No   • Sexual activity: Defer        Review of Systems   Neurological: Positive for numbness.        Tingling.    All other systems reviewed and are negative.      PHYSICAL EXAMINATION:       Ht 167.6 cm (66\")   Wt 97.5 kg (215 lb)   LMP 10/05/2019   BMI 34.70 kg/m²     Physical Exam   Constitutional: She is oriented to person, place, and time. Vital signs are normal. She appears well-developed and well-nourished. She is cooperative.   HENT:   Head: Normocephalic and atraumatic.   Neck: Trachea normal and phonation normal.   Pulmonary/Chest: Effort normal. No respiratory distress.   Abdominal: Soft. Normal appearance. She exhibits no distension.   Neurological: She is alert and oriented to person, place, and time. GCS eye subscore is 4. " GCS verbal subscore is 5. GCS motor subscore is 6.   Skin: Skin is warm, dry and intact. Capillary refill takes less than 2 seconds.   Psychiatric: She has a normal mood and affect. Her speech is normal and behavior is normal. Judgment and thought content normal. Cognition and memory are normal.   Vitals reviewed.      GAIT:     [x]  Normal  []  Antalgic    Assistive device: [x]  None  []  Walker     []  Crutches  []  Cane     []  Wheelchair  []  Stretcher    Right Hand Exam     Tenderness   The patient is experiencing tenderness in the ulnar area.    Range of Motion   Wrist   Extension: abnormal   Flexion: abnormal   Pronation: abnormal   Supination: abnormal     Muscle Strength   Wrist extension: 4/5   Wrist flexion: 4/5   : 4/5     Tests   Finkelstein's test: negative    Other   Erythema: absent  Scars: absent  Sensation: normal  Pulse: present      Left Hand Exam   Left hand exam is normal.              Xr Wrist 3+ View Right (in Office)    Result Date: 10/18/2019  Narrative: Three view right wrist HISTORY: Chronic right wrist pain. Medial pain. AP, lateral and oblique views obtained. COMPARISON: None FINDINGS: No fracture or dislocation. No other osseous or articular abnormality.     Impression: CONCLUSION: Normal right wrist [id number] Electronically signed by:  Luis Fernando Ortega MD  10/18/2019 3:42 PM CDT Workstation: [email or web address]          ASSESSMENT:    Diagnoses and all orders for this visit:    Right wrist pain  -     MRI Wrist Right Without Contrast; Future  -     Ambulatory Referral to Physical Therapy          PLAN  Recommend MRI of the wrist for further evaluation on the wrist pain which started several months ago and has not improved despite previous anti-inflammatories, bracing and home exercises.  No Follow-up on file.    Juan Shaw, APRN

## 2019-11-01 ENCOUNTER — OFFICE VISIT (OUTPATIENT)
Dept: FAMILY MEDICINE CLINIC | Facility: CLINIC | Age: 39
End: 2019-11-01

## 2019-11-01 ENCOUNTER — TREATMENT (OUTPATIENT)
Dept: PHYSICAL THERAPY | Facility: CLINIC | Age: 39
End: 2019-11-01

## 2019-11-01 VITALS
OXYGEN SATURATION: 98 % | SYSTOLIC BLOOD PRESSURE: 130 MMHG | HEART RATE: 71 BPM | TEMPERATURE: 98.4 F | HEIGHT: 66 IN | BODY MASS INDEX: 34.78 KG/M2 | DIASTOLIC BLOOD PRESSURE: 88 MMHG | WEIGHT: 216.38 LBS | RESPIRATION RATE: 18 BRPM

## 2019-11-01 DIAGNOSIS — M25.531 RIGHT WRIST PAIN: Primary | ICD-10-CM

## 2019-11-01 DIAGNOSIS — J20.9 ACUTE BRONCHITIS, UNSPECIFIED ORGANISM: Primary | ICD-10-CM

## 2019-11-01 DIAGNOSIS — J02.9 SORE THROAT: ICD-10-CM

## 2019-11-01 LAB
EXPIRATION DATE: NORMAL
INTERNAL CONTROL: NORMAL
Lab: NORMAL
S PYO AG THROAT QL: NEGATIVE

## 2019-11-01 PROCEDURE — 97110 THERAPEUTIC EXERCISES: CPT | Performed by: PHYSICAL THERAPIST

## 2019-11-01 PROCEDURE — 99213 OFFICE O/P EST LOW 20 MIN: CPT | Performed by: NURSE PRACTITIONER

## 2019-11-01 PROCEDURE — 97162 PT EVAL MOD COMPLEX 30 MIN: CPT | Performed by: PHYSICAL THERAPIST

## 2019-11-01 PROCEDURE — A9999 DME SUPPLY OR ACCESSORY, NOS: HCPCS | Performed by: PHYSICAL THERAPIST

## 2019-11-01 PROCEDURE — 87880 STREP A ASSAY W/OPTIC: CPT | Performed by: NURSE PRACTITIONER

## 2019-11-01 PROCEDURE — 87081 CULTURE SCREEN ONLY: CPT | Performed by: NURSE PRACTITIONER

## 2019-11-01 RX ORDER — DEXTROMETHORPHAN HYDROBROMIDE AND PROMETHAZINE HYDROCHLORIDE 15; 6.25 MG/5ML; MG/5ML
5 SYRUP ORAL 4 TIMES DAILY PRN
Qty: 180 ML | Refills: 0 | Status: SHIPPED | OUTPATIENT
Start: 2019-11-01 | End: 2019-11-20

## 2019-11-01 RX ORDER — ALBUTEROL SULFATE 90 UG/1
2 AEROSOL, METERED RESPIRATORY (INHALATION) EVERY 4 HOURS PRN
Qty: 18 G | Refills: 0 | Status: SHIPPED | OUTPATIENT
Start: 2019-11-01 | End: 2019-11-20

## 2019-11-01 NOTE — PROGRESS NOTES
"Subjective   Sara Edwards is a 38 y.o. female.     [facility] Walk in Clinic Visit    PCP: DELPHINE Kahn    CC: \"sore throat, hard to breathe, cough\"      Sore Throat    This is a new problem. Episode onset: x 3 days. The problem has been unchanged. There has been no fever. The pain is at a severity of 1/10. Associated symptoms include coughing and shortness of breath (hard to take a deep breath at times). Pertinent negatives include no abdominal pain, congestion, diarrhea, ear discharge, ear pain, headaches, hoarse voice, plugged ear sensation, neck pain, stridor, swollen glands, trouble swallowing or vomiting. She has had no exposure to strep or mono. She has tried nothing (just finished Medrol for wirst) for the symptoms.   Cough   This is a new problem. Episode onset: x 3 days. The problem has been unchanged. The problem occurs every few minutes. The cough is non-productive. Associated symptoms include chest pain (tightness with cough), a sore throat, shortness of breath (hard to take a deep breath at times) and wheezing (at times). Pertinent negatives include no chills, ear congestion, ear pain, fever, headaches, heartburn, hemoptysis, myalgias, nasal congestion, postnasal drip, rash, rhinorrhea, sweats or weight loss. Nothing aggravates the symptoms. She has tried nothing for the symptoms.        The following portions of the patient's history were reviewed and updated as appropriate: allergies, current medications, past medical history, past social history, past surgical history and problem list.    Review of Systems   Constitutional: Negative for appetite change, chills, fever and unexpected weight loss.   HENT: Positive for sore throat. Negative for congestion, ear discharge, ear pain, hoarse voice, postnasal drip, rhinorrhea, sinus pressure, sneezing, swollen glands and trouble swallowing.    Eyes: Negative.    Respiratory: Positive for cough, chest tightness, shortness of breath (hard to take a deep " "breath at times) and wheezing (at times). Negative for hemoptysis and stridor.    Cardiovascular: Positive for chest pain (tightness with cough). Negative for palpitations.   Gastrointestinal: Negative for abdominal pain, diarrhea, nausea and vomiting.   Genitourinary: Negative for difficulty urinating.   Musculoskeletal: Negative for myalgias and neck pain.   Skin: Negative for rash.   Neurological: Negative for dizziness and headache.     /88 (BP Location: Right arm, Patient Position: Sitting, Cuff Size: Large Adult)   Pulse 71   Temp 98.4 °F (36.9 °C) (Oral)   Resp 18   Ht 167.6 cm (66\")   Wt 98.1 kg (216 lb 6 oz)   LMP 10/05/2019   SpO2 98%   Breastfeeding? No   BMI 34.92 kg/m²     Objective   Physical Exam   Constitutional: She is oriented to person, place, and time. She appears well-developed and well-nourished. No distress.   HENT:   Head: Normocephalic and atraumatic.   Right Ear: Tympanic membrane and ear canal normal.   Left Ear: Tympanic membrane and ear canal normal.   Nose: Nose normal. Right sinus exhibits no maxillary sinus tenderness and no frontal sinus tenderness. Left sinus exhibits no maxillary sinus tenderness and no frontal sinus tenderness.   Mouth/Throat: Uvula is midline, oropharynx is clear and moist and mucous membranes are normal.   Eyes: Conjunctivae are normal. Right eye exhibits no discharge. Left eye exhibits no discharge.   Cardiovascular: Normal rate and regular rhythm.   Pulmonary/Chest: Effort normal and breath sounds normal.   Lymphadenopathy:     She has no cervical adenopathy.   Neurological: She is alert and oriented to person, place, and time.   Nursing note and vitals reviewed.    Recent Results (from the past 24 hour(s))   POC Rapid Strep A    Collection Time: 11/01/19 12:37 PM   Result Value Ref Range    Rapid Strep A Screen Negative Negative, VALID, INVALID, Not Performed    Internal Control Passed Passed    Lot Number [id number]     Expiration Date " 02/21/2022              Assessment/Plan   Sara was seen today for sore throat, shortness of breath and cough.    Diagnoses and all orders for this visit:    Acute bronchitis, unspecified organism  -     albuterol sulfate  (90 Base) MCG/ACT inhaler; Inhale 2 puffs Every 4 (Four) Hours As Needed for Wheezing or Shortness of Air.  -     promethazine-dextromethorphan (PROMETHAZINE-DM) 6.25-15 MG/5ML syrup; Take 5 mL by mouth 4 (Four) Times a Day As Needed for Cough.    Sore throat  -     Beta Strep Culture, Throat - Swab, Throat  -     POC Rapid Strep A      Push fluids  Rest  Tylenol as needed  Rx for Ventolin HFA, Phenergan DM syrup  Back up strep culture pending.  Will call and treat accordingly if +.     See PCP or RTC if symptoms persist/worsen  See PCP for routine f/u visit and management of chronic medical conditions

## 2019-11-01 NOTE — PROGRESS NOTES
"  Physical Therapy Initial Evaluation and Plan of Care        Patient: Sara Edwards   : 1980  Diagnosis/ICD-10 Code:  Right wrist pain [M25.531]  Referring practitioner: DELPHINE Schuler  Date of Initial Visit: 2019  Today's Date: 2019  Patient seen for 1 sessions    Next MD appt: [id number] .  Recertification: 2019           Subjective Questionnaire: QuickDASH: 36.36%      Subjective Evaluation    History of Present Illness  Onset date: ~4 months ago.  Mechanism of injury: Patient reports she was using a sledge hammer to dig up posts in the yard and was beating concrete to try to get them up.    Subjective comment: She reports she saw samuel and got a steroid pack and splint and it got better, but then about a month and a half ago it did it again while working.  Patient Occupation: [employer] Pain  Pain scale: \"achines\"  At best pain ratin  At worst pain ratin  Location: R wrist  Quality: dull ache and discomfort  Relieving factors: rest  Aggravating factors: movement  Progression: no change    Social Support  Lives with: young children and spouse    Hand dominance: right    Diagnostic Tests  X-ray: normal    Treatments  Previous treatment: medication  Current treatment: medication  Patient Goals  Patient goals for therapy: decreased pain  Patient goal: Get an MRI           Objective       Tenderness     Right Wrist/Hand   Tenderness in the triangular fibrocartilage complex .     Neurological Testing     Sensation     Wrist/Hand   Left   Intact: light touch and hot/cold discrimination    Right   Intact: light touch and hot/cold discrimination    Active Range of Motion     Left Elbow   Normal active range of motion    Right Elbow   Forearm supination: 60 degrees with pain  Forearm pronation: 64 degrees with pain    Left Wrist   Normal active range of motion  Wrist flexion: 88 degrees   Wrist extension: 70 degrees   Radial deviation: 28 degrees   Ulnar deviation: 40 degrees "     Right Wrist   Wrist flexion: 80 degrees   Wrist extension: 50 degrees with pain  Radial deviation: 18 degrees with pain  Ulnar deviation: 36 degrees with pain    Additional Active Range of Motion Details  Full Composite fist B, very loose fist on R.    Strength/Myotome Testing     Left Wrist/Hand   Normal wrist strength     (2nd hand position)     Trial 1: 68 lbs    Trial 2: 65 lbs    Trial 3: 58 lbs    Average: 63.67 lbs    Right Wrist/Hand   Wrist extension: 4  Wrist flexion: 4  Radial deviation: 4  Ulnar deviation: 4     (2nd hand position)     Trial 1: 32 lbs    Trial 2: 28 lbs    Trial 3: 28 lbs    Average: 29.33 lbs    Tests     Right Elbow   Negative Tinel's sign (cubital tunnel).     Right Wrist/Hand   Positive distal radial-ulnar joint stress and ulnotriquetral shear.   Negative CMC grind, Finkelstein's, Froment's sign, Phalen's sign, RCL varus stress, resisted middle finger, Tinel's sign (medial nerve), Tinel's sign (radial tunnel) and UCL valgus stress.     Ambulation     Comments   FWB, non-antalgic gait, no distress, normal arm swing with gait.         Assessment & Plan     Assessment  Impairments: abnormal or restricted ROM, activity intolerance, impaired physical strength, lacks appropriate home exercise program and pain with function  Assessment details: Patient exhibits some TTP at the ulnar sided wrist as well as some at the triangular fibrocartilage complex. She has pain with ROM, especially with pro/supination and inability to perform pain-free normal AROM. Patient would benefit form PT to address ROM, strength, and function,  Prognosis: fair  Prognosis details: Barriers to Rehab: Include significant or possible arthritic/degenerative changes that have occurred within the joints, The chronicity of this issue.    Safety Issues: None noted.    Functional Limitations: carrying objects, lifting, sleeping, pulling, pushing, uncomfortable because of pain and unable to perform repetitive  tasks  Goals  Plan Goals: Short Term Goals:  1) I with HEP and have additions/changes by next re-certification.    2) AROM R wrist flexion>=85°.    3) AROM R wrist extension>=60°.    4) AROM R wrist UD >= 40°.    5) AROM R wrist RD >= 25°.    6) R wrist MMT >= 4+/5.    7) R  at the #2 setting >= 40#.    8) Patient to have AROM pronation/supination for the R forearm >= 80°.    9) Patient able to make a full tight composite fist.    Long Term goals:  1) AROM for the R wrist all WNL, no increase in pain.    2) B wrist 5/5, no increase in pain.    3) R  at the #2 setting >= 60#.    4) patient able to perform 5 minutes of fine motor activities with no increase in pain.    5) patient able to perform 1 arm box carry with 15# for 3 minutes with no increase in pain.    6) patient able to perform impact and vibration activities with no increase in pain.    7) I with final HEP    8) D/C with a final HEP and free 30 day fitness formula memebership.        Plan  Therapy options: will be seen for skilled physical therapy services  Planned modality interventions: cryotherapy, fluidotherapy, thermotherapy (paraffin bath) and thermotherapy (hydrocollator packs)  Planned therapy interventions: body mechanics training, fine motor coordination training, flexibility, functional ROM exercises, home exercise program, manual therapy, soft tissue mobilization, strengthening, stretching and therapeutic activities  Duration in visits: 20  Treatment plan discussed with: patient  Plan details: Progress overall ROM, strength, , desensitization, activity tolerance/work specific, and s/s management.       Other therapeutic activities and/or exercises will be prescribed depending on the patients progress or lack there of.    Visit Diagnoses:    ICD-10-CM ICD-9-CM   1. Right wrist pain M25.531 719.43       Timed:  Manual Therapy:         mins  38963;  Therapeutic Exercise:    16     mins  12042;     Neuromuscular Bill:        mins   37741;    Therapeutic Activity:          mins  61125;     Gait Training:           mins  28174;     Ultrasound:          mins  20151;    Electrical Stimulation:         mins  22212 ( );    Untimed:  Electrical Stimulation:         mins  76648 ( );  Mechanical Traction:         mins  73161;     Timed Treatment:   16   mins   Total Treatment:     36   mins    PT SIGNATURE: Soha Min, PT DPT, CSCS   DATE TREATMENT INITIATED: 11/1/2019    Initial Certification  Certification Period: 1/30/2020  I certify that the therapy services are furnished while this patient is under my care.  The services outlined above are required by this patient, and will be reviewed every 90 days.     PHYSICIAN: Juan Shaw, APRN      DATE:     Please sign and return via fax to  .. Thank you, [facility].

## 2019-11-01 NOTE — PATIENT INSTRUCTIONS
Acute Bronchitis, Adult  Acute bronchitis is when air tubes (bronchi) in the lungs suddenly get swollen. The condition can make it hard to breathe. It can also cause these symptoms:  · A cough.  · Coughing up clear, yellow, or green mucus.  · Wheezing.  · Chest congestion.  · Shortness of breath.  · A fever.  · Body aches.  · Chills.  · A sore throat.  Follow these instructions at home:    Medicines  · Take over-the-counter and prescription medicines only as told by your doctor.  · If you were prescribed an antibiotic medicine, take it as told by your doctor. Do not stop taking the antibiotic even if you start to feel better.  General instructions  · Rest.  · Drink enough fluids to keep your pee (urine) pale yellow.  · Avoid smoking and secondhand smoke. If you smoke and you need help quitting, ask your doctor. Quitting will help your lungs heal faster.  · Use an inhaler, cool mist vaporizer, or humidifier as told by your doctor.  · Keep all follow-up visits as told by your doctor. This is important.  How is this prevented?  To lower your risk of getting this condition again:  · Wash your hands often with soap and water. If you cannot use soap and water, use hand .  · Avoid contact with people who have cold symptoms.  · Try not to touch your hands to your mouth, nose, or eyes.  · Make sure to get the flu shot every year.  Contact a doctor if:  · Your symptoms do not get better in 2 weeks.  Get help right away if:  · You cough up blood.  · You have chest pain.  · You have very bad shortness of breath.  · You become dehydrated.  · You faint (pass out) or keep feeling like you are going to pass out.  · You keep throwing up (vomiting).  · You have a very bad headache.  · Your fever or chills gets worse.  This information is not intended to replace advice given to you by your health care provider. Make sure you discuss any questions you have with your health care provider.  Document Released: 06/05/2009 Document  Revised: 08/01/2018 Document Reviewed: 06/07/2017  [facility] Interactive Patient Education © 2019 [employer]

## 2019-11-03 LAB
BACTERIA SPEC AEROBE CULT: NORMAL
BACTERIA SPEC AEROBE CULT: NORMAL

## 2019-11-04 ENCOUNTER — TELEPHONE (OUTPATIENT)
Dept: FAMILY MEDICINE CLINIC | Facility: CLINIC | Age: 39
End: 2019-11-04

## 2019-11-04 NOTE — TELEPHONE ENCOUNTER
----- Message from DELPHINE Flores sent at 10/31/2019  3:14 PM CDT -----  Vitamin D is low.  Is she taking a vitamin D supplement?  WBC is significantly elevated.  Had she been taking an steroids or feeling ill prior to having these labs drawn?    Pt stated she has not been taking her Vit D but will start taking it. Also she has been on steroids before having these labs drawn.

## 2019-11-04 NOTE — TELEPHONE ENCOUNTER
----- Message from DELPHINE Flores sent at 10/31/2019  3:14 PM CDT -----  Vitamin D is low.  Is she taking a vitamin D supplement?  WBC is significantly elevated.  Had she been taking an steroids or feeling ill prior to having these labs drawn?

## 2019-11-06 ENCOUNTER — TREATMENT (OUTPATIENT)
Dept: PHYSICAL THERAPY | Facility: CLINIC | Age: 39
End: 2019-11-06

## 2019-11-06 DIAGNOSIS — M25.531 RIGHT WRIST PAIN: Primary | ICD-10-CM

## 2019-11-06 PROCEDURE — 97110 THERAPEUTIC EXERCISES: CPT | Performed by: PHYSICAL THERAPIST

## 2019-11-06 NOTE — PROGRESS NOTES
Physical Therapy Daily Progress Note      Patient: Sara Edwards   : 1980  Referring practitioner: DELPHINE Schuler  Date of Initial Visit: Type: THERAPY  Noted: 2019  Today's Date: 2019  Patient seen for 2 sessions    Next MD appt: TBD .  Recertification: 2019           Subjective     Objective   See Exercise, Manual, and Modality Logs for complete treatment.       Assessment & Plan     Assessment  Assessment details: No new goals met at this time. Pt reports that she has done stretches most days 2 times per patient. Pt also reports that she has not been icing like she should. Pt given her new ex of ulna and radial deviation ex,claw hand,thumb circles, table top ex this date.     Goals  Plan Goals: 1) I with HEP and have additions/changes by next re-certification.    2) AROM R wrist flexion>=85°.    3) AROM R wrist extension>=60°.    4) AROM R wrist UD >= 40°.    5) AROM R wrist RD >= 25°.    6) R wrist MMT >= 4+/5.    7) R  at the #2 setting >= 40#.    8) Patient to have AROM pronation/supination for the R forearm >= 80°.    9) Patient able to make a full tight composite fist.    Long Term goals:  1) AROM for the R wrist all WNL, no increase in pain.    2) B wrist 5/5, no increase in pain.    3) R  at the #2 setting >= 60#.    4) patient able to perform 5 minutes of fine motor activities with no increase in pain.    5) patient able to perform 1 arm box carry with 15# for 3 minutes with no increase in pain.    6) patient able to perform impact and vibration activities with no increase in pain.    7) I with final HEP    8) D/C with a final HEP and free 30 day fitness formula memebership.     Plan  Plan details: Add one pound weight to wrist flexion/extension.        Visit Diagnoses:    ICD-10-CM ICD-9-CM   1. Right wrist pain M25.531 719.43       Progress per Plan of Care and Progress strengthening /stabilization /functional activity           Timed:  Manual  Therapy:         mins  64520;  Therapeutic Exercise:    44     mins  00162;     Neuromuscular Bill:        mins  60679;    Therapeutic Activity:          mins  88206;     Gait Training:           mins  69510;     Ultrasound:          mins  06612;    Electrical Stimulation:         mins  39322 ( );    Untimed:  Electrical Stimulation:         mins  80436 ( );  Mechanical Traction:         mins  14944;     Timed Treatment:   44   mins   Total Treatment:     54   mins  Yecenia Brothers, [facility]  Physical Therapist Assistant

## 2019-11-11 ENCOUNTER — TREATMENT (OUTPATIENT)
Dept: PHYSICAL THERAPY | Facility: CLINIC | Age: 39
End: 2019-11-11

## 2019-11-11 DIAGNOSIS — M25.531 RIGHT WRIST PAIN: Primary | ICD-10-CM

## 2019-11-11 PROCEDURE — 97110 THERAPEUTIC EXERCISES: CPT | Performed by: PHYSICAL THERAPIST

## 2019-11-11 NOTE — PROGRESS NOTES
Subjective   Sara Edwards is a 38 y.o. female.     She presents today for routine wellness exam.  Her blood pressure is normal today in the office.  She continues to struggle with obesity.  She is due for her routine fasting labs.  Without any new complaints today in the office.      Obesity   This is a chronic problem. The current episode started more than 1 year ago. The problem occurs constantly. The problem has been unchanged. Associated symptoms include arthralgias. Pertinent negatives include no abdominal pain, anorexia, change in bowel habit, chest pain, chills, congestion, coughing, diaphoresis, fatigue, fever, headaches, joint swelling, myalgias, nausea, neck pain, numbness, rash, sore throat, swollen glands, urinary symptoms, vertigo, visual change, vomiting or weakness. The symptoms are aggravated by drinking and eating. She has tried nothing for the symptoms. The treatment provided no relief.        The following portions of the patient's history were reviewed and updated as appropriate: allergies, current medications, past family history, past medical history, past social history, past surgical history and problem list.    Review of Systems   Constitutional: Negative.  Negative for chills, diaphoresis, fatigue and fever.   HENT: Negative.  Negative for congestion, sore throat and swollen glands.    Eyes: Negative.    Respiratory: Negative.  Negative for cough.    Cardiovascular: Negative.  Negative for chest pain.   Gastrointestinal: Negative.  Negative for abdominal pain, anorexia, change in bowel habit, nausea and vomiting.   Musculoskeletal: Positive for arthralgias. Negative for joint swelling, myalgias and neck pain.   Skin: Negative.  Negative for rash.   Allergic/Immunologic: Negative.    Neurological: Negative.  Negative for vertigo, weakness and numbness.   Hematological: Negative.    Psychiatric/Behavioral: Negative.        Objective   Physical Exam   Constitutional: She is oriented  to person, place, and time. Vital signs are normal. She appears well-developed and well-nourished. No distress. She is obese.  HENT:   Head: Normocephalic.   Right Ear: External ear normal.   Left Ear: External ear normal.   Nose: Nose normal.   Mouth/Throat: Oropharynx is clear and moist. No oropharyngeal exudate.   Eyes: Conjunctivae and EOM are normal. Pupils are equal, round, and reactive to light. Right eye exhibits no discharge. Left eye exhibits no discharge.   Neck: Normal range of motion. Neck supple. No tracheal deviation present. No thyromegaly present.   Cardiovascular: Normal rate, regular rhythm and normal heart sounds. Exam reveals no gallop and no friction rub.   No murmur heard.  Pulmonary/Chest: Effort normal and breath sounds normal. No respiratory distress. She has no wheezes. She has no rales. She exhibits no tenderness.   Musculoskeletal: Normal range of motion.   Lymphadenopathy:     She has no cervical adenopathy.   Neurological: She is alert and oriented to person, place, and time.   Skin: Skin is warm and dry. Capillary refill takes less than 2 seconds. No rash noted. She is not diaphoretic. No erythema. No pallor.   Psychiatric: She has a normal mood and affect. Her behavior is normal. Judgment and thought content normal.   Nursing note and vitals reviewed.        Assessment/Plan   Sara was seen today for uri and cough.    Diagnoses and all orders for this visit:    Well adult exam  -     CBC & Differential  -     Comprehensive Metabolic Panel  -     Hemoglobin A1c  -     Lipid Panel  -     TSH  -     Vitamin D 25 Hydroxy  -     CBC Auto Differential    Screening for diabetes mellitus  -     CBC & Differential  -     Comprehensive Metabolic Panel  -     Hemoglobin A1c  -     Lipid Panel  -     TSH  -     Vitamin D 25 Hydroxy  -     CBC Auto Differential    Screening for lipoid disorders  -     CBC & Differential  -     Comprehensive Metabolic Panel  -     Hemoglobin A1c  -     Lipid  Panel  -     TSH  -     Vitamin D 25 Hydroxy  -     CBC Auto Differential    Screening for thyroid disorder  -     CBC & Differential  -     Comprehensive Metabolic Panel  -     Hemoglobin A1c  -     Lipid Panel  -     TSH  -     Vitamin D 25 Hydroxy  -     CBC Auto Differential    Vitamin D deficiency  -     Cholecalciferol (VITAMIN D) 50 MCG (2000 UT) tablet; Take 2,000 Units by mouth Daily.    Obesity (BMI 30-39.9)               Patient's Body mass index is 34.54 kg/m². BMI is above normal parameters. Recommendations include: educational material.    Fasting labs.  Continue current medications.  Follow up as scheduled for routine follow up.  Follow up sooner for problems/concerns.  Patient verbalized understanding and agreement with plan of care.        This document has been electronically signed by DELPHINE Flores on November 10, 2019 9:13 PM

## 2019-11-22 ENCOUNTER — OFFICE VISIT (OUTPATIENT)
Dept: ORTHOPEDIC SURGERY | Facility: CLINIC | Age: 39
End: 2019-11-22

## 2019-11-22 VITALS — BODY MASS INDEX: 35.68 KG/M2 | WEIGHT: 222 LBS | HEIGHT: 66 IN

## 2019-11-22 DIAGNOSIS — M25.531 RIGHT WRIST PAIN: ICD-10-CM

## 2019-11-22 DIAGNOSIS — S63.591A COMPLEX TEAR OF TRIANGULAR FIBROCARTILAGE OF RIGHT WRIST, INITIAL ENCOUNTER: Primary | ICD-10-CM

## 2019-11-22 PROCEDURE — 99214 OFFICE O/P EST MOD 30 MIN: CPT | Performed by: NURSE PRACTITIONER

## 2019-11-22 NOTE — PROGRESS NOTES
"Sara Edwards is a 38 y.o. female returns for     Chief Complaint   Patient presents with   • Right Wrist - Follow-up, Pain   • Results       HISTORY OF PRESENT ILLNESS:     38-year-old  female in the office today for follow-up evaluation of right wrist pain.  She continues to utilize the brace which does improve her symptoms.  She is currently taking meloxicam as needed for discomfort which to decrease improves her discomfort.  She denies any fever chills or evidence of infection.  Pain originally began after she was attempting to pull up some fence post that was secured in concrete several months ago.     CONCURRENT MEDICAL HISTORY:    The following portions of the patient's history were reviewed and updated as appropriate: allergies, current medications, past family history, past medical history, past social history, past surgical history and problem list.     ROS  No fevers or chills.  No chest pain or shortness of air.  No GI or  disturbances.    PHYSICAL EXAMINATION:       Ht 167.6 cm (66\")   Wt 101 kg (222 lb)   BMI 35.83 kg/m²     Physical Exam   Constitutional: She is oriented to person, place, and time. Vital signs are normal. She appears well-developed and well-nourished. She is cooperative.   HENT:   Head: Normocephalic and atraumatic.   Neck: Trachea normal and phonation normal.   Pulmonary/Chest: Effort normal. No respiratory distress.   Abdominal: Soft. Normal appearance. She exhibits no distension.   Neurological: She is alert and oriented to person, place, and time. GCS eye subscore is 4. GCS verbal subscore is 5. GCS motor subscore is 6.   Skin: Skin is warm, dry and intact. Capillary refill takes less than 2 seconds.   Psychiatric: She has a normal mood and affect. Her speech is normal and behavior is normal. Judgment and thought content normal. Cognition and memory are normal.   Vitals reviewed.      GAIT:     [x]  Normal  []  Antalgic    Assistive device: [x]  None  []  " Walker     []  Crutches  []  Cane     []  Wheelchair  []  Stretcher    Right Hand Exam     Tenderness   The patient is experiencing tenderness in the ulnar area.    Range of Motion   Wrist   Extension: abnormal   Flexion: abnormal   Pronation: abnormal   Supination: abnormal     Muscle Strength   Wrist extension: 4/5   Wrist flexion: 4/5   : 4/5     Tests   Finkelstein's test: negative    Other   Erythema: absent  Scars: absent  Sensation: normal  Pulse: present      Left Hand Exam   Left hand exam is normal.              No results found.          ASSESSMENT:    Diagnoses and all orders for this visit:    Complex tear of triangular fibrocartilage of right wrist, initial encounter    Right wrist pain      Mri right wrist 11/15/2019 @ [facility].   Impression  Degenerative changes with mild irregularity of the triangular fibrocartilage at the insertion site on the ulna with fluid in the radioulnar joint. The findings are concerning for at least a partial tear of the triangular fibrocartilage. Correlation is recommended.     PLAN  I reviewed the MRI results which reveal a tear in the triangular fibrocartilage of the wrist.  We discussed options in detail including injection, physical therapy continued bracing anti-inflammatories and at this time I will recommend a referral to hand surgeon for further evaluation of pains.  Patient will contact the office when she is ready to proceed  No Follow-up on file.    DELPHINE Ovalle

## 2019-12-03 ENCOUNTER — TELEPHONE (OUTPATIENT)
Dept: FAMILY MEDICINE CLINIC | Facility: CLINIC | Age: 39
End: 2019-12-03

## 2019-12-03 NOTE — TELEPHONE ENCOUNTER
Pt stated that she has tried benadryl and cortisone cream for her rash that she was seen for but it has only gotten worse. She would like an ointment sent into [facility] pharmacy or a call at [phone] with advice.

## 2019-12-04 ENCOUNTER — OFFICE VISIT (OUTPATIENT)
Dept: FAMILY MEDICINE CLINIC | Facility: CLINIC | Age: 39
End: 2019-12-04

## 2019-12-04 VITALS
HEIGHT: 66 IN | WEIGHT: 223.38 LBS | DIASTOLIC BLOOD PRESSURE: 88 MMHG | BODY MASS INDEX: 35.9 KG/M2 | HEART RATE: 79 BPM | TEMPERATURE: 97.7 F | SYSTOLIC BLOOD PRESSURE: 116 MMHG | RESPIRATION RATE: 20 BRPM | OXYGEN SATURATION: 97 %

## 2019-12-04 DIAGNOSIS — J00 COMMON COLD: ICD-10-CM

## 2019-12-04 DIAGNOSIS — L30.9 DERMATITIS: Primary | ICD-10-CM

## 2019-12-04 PROCEDURE — 99214 OFFICE O/P EST MOD 30 MIN: CPT | Performed by: NURSE PRACTITIONER

## 2019-12-04 NOTE — PROGRESS NOTES
"Subjective   Sara Edwards is a 39 y.o. female.     [facility] Visit    PCP: DELPHINE Kahn    CC: \"continuing rash; cold\"      Rash   Chronicity: persistent. Episode onset: treated for same in October. The problem has been gradually worsening since onset. Location: was origninally just left forearm, now bilateral antecubital spaces, around neck and left torso area. The rash is characterized by itchiness. It is unknown if there was an exposure to a precipitant. Associated symptoms include congestion, coughing and rhinorrhea (all clear). Pertinent negatives include no diarrhea, fatigue, fever, joint pain, shortness of breath, sore throat or vomiting. Treatments tried: had Medrol in October and has been using hydrocortisone and benadryl cream. The treatment provided mild relief. (Does have nickel allergy)   URI    This is a new problem. Episode onset: x 2-3 days. The problem has been unchanged. There has been no fever. Associated symptoms include congestion, coughing, a rash, rhinorrhea (all clear) and sneezing. Pertinent negatives include no abdominal pain, chest pain, diarrhea, dysuria, ear pain, headaches, joint pain, joint swelling, nausea, neck pain, plugged ear sensation, sinus pain, sore throat, swollen glands, vomiting or wheezing. She has tried nothing for the symptoms.        The following portions of the patient's history were reviewed and updated as appropriate: allergies, current medications, past medical history, past social history, past surgical history and problem list.    Review of Systems   Constitutional: Negative for appetite change, fatigue and fever.   HENT: Positive for congestion, rhinorrhea (all clear) and sneezing. Negative for ear discharge, ear pain, postnasal drip, sinus pressure, sore throat and swollen glands.    Respiratory: Positive for cough. Negative for chest tightness, shortness of breath and wheezing.    Cardiovascular: Negative for chest pain.   Gastrointestinal: " "Negative for abdominal pain, diarrhea, nausea and vomiting.   Genitourinary: Negative for difficulty urinating and dysuria.   Musculoskeletal: Negative for joint pain and neck pain.   Skin: Positive for rash.   Neurological: Negative for dizziness and headache.     /88 (BP Location: Right arm, Patient Position: Sitting, Cuff Size: Adult)   Pulse 79   Temp 97.7 °F (36.5 °C) (Oral)   Resp 20   Ht 167.6 cm (66\")   Wt 101 kg (223 lb 6 oz)   SpO2 97%   BMI 36.05 kg/m²     Objective   Physical Exam   Constitutional: She is oriented to person, place, and time. She appears well-developed and well-nourished. No distress.   HENT:   Head: Normocephalic and atraumatic.   Right Ear: Tympanic membrane and ear canal normal.   Left Ear: Ear canal normal. Tympanic membrane is not erythematous. A middle ear effusion (small) is present.   Nose: Mucosal edema and congestion ( mild) present. Right sinus exhibits no maxillary sinus tenderness and no frontal sinus tenderness. Left sinus exhibits no maxillary sinus tenderness and no frontal sinus tenderness.   Mouth/Throat: Uvula is midline, oropharynx is clear and moist and mucous membranes are normal.   Eyes: Conjunctivae are normal. Right eye exhibits no discharge. Left eye exhibits no discharge.   Neck: Neck supple.   Cardiovascular: Normal rate and regular rhythm.   Pulmonary/Chest: Effort normal and breath sounds normal. She has no wheezes. She has no rales.   +loose cough     Lymphadenopathy:     She has no cervical adenopathy.   Neurological: She is alert and oriented to person, place, and time.   Skin: Skin is warm and dry. Rash (pink raised rash over bilateral antecubital spaces, around the neck and to left torso) noted.        Nursing note and vitals reviewed.    No results found for this or any previous visit (from the past 24 hour(s)).          Assessment/Plan   Sara was seen today for rash.    Diagnoses and all orders for this visit:    Dermatitis  -     " triamcinolone (KENALOG) 0.1 % ointment; Apply  topically to the appropriate area as directed 2 (Two) Times a Day.    Common cold    Declines steroid injection.  Rx for Triamcinalone ointment provided.    Switch to free and clear detergents, soaps, etc  If no improvement in 2 weeks, she will let us know and we will refer to dermatology    Declines meds for cold.  Has cough med from previous prescription she can use.   Push fluids  Rest  Tylenol or Motrin     Declines RTW note    Patient's Body mass index is 36.05 kg/m². BMI is above normal parameters. Recommendations include: referral to primary care.     See PCP or RTC if symptoms persist/worsen  See PCP for routine f/u visit and management of chronic medical conditions

## 2019-12-04 NOTE — PATIENT INSTRUCTIONS
Viral Respiratory Infection  A respiratory infection is an illness that affects part of the respiratory system, such as the lungs, nose, or throat. A respiratory infection that is caused by a virus is called a viral respiratory infection.  Common types of viral respiratory infections include:  · A cold.  · The flu (influenza).  · A respiratory syncytial virus (RSV) infection.  What are the causes?  This condition is caused by a virus.  What are the signs or symptoms?  Symptoms of this condition include:  · A stuffy or runny nose.  · Yellow or green nasal discharge.  · A cough.  · Sneezing.  · Fatigue.  · Achy muscles.  · A sore throat.  · Sweating or chills.  · A fever.  · A headache.  How is this diagnosed?  This condition may be diagnosed based on:  · Your symptoms.  · A physical exam.  · Testing of nasal swabs.  How is this treated?  This condition may be treated with medicines, such as:  · Antiviral medicine. This may shorten the length of time a person has symptoms.  · Expectorants. These make it easier to cough up mucus.  · Decongestant nasal sprays.  · Acetaminophen or NSAIDs to relieve fever and pain.  Antibiotic medicines are not prescribed for viral infections. This is because antibiotics are designed to kill bacteria. They are not effective against viruses.  Follow these instructions at home:    Managing pain and congestion  · Take over-the-counter and prescription medicines only as told by your health care provider.  · If you have a sore throat, gargle with a salt-water mixture 3-4 times a day or as needed. To make a salt-water mixture, completely dissolve ½-1 tsp of salt in 1 cup of warm water.  · Use nose drops made from salt water to ease congestion and soften raw skin around your nose.  · Drink enough fluid to keep your urine pale yellow. This helps prevent dehydration and helps loosen up mucus.  General instructions  · Rest as much as possible.  · Do not drink alcohol.  · Do not use any products  that contain nicotine or tobacco, such as cigarettes and e-cigarettes. If you need help quitting, ask your health care provider.  · Keep all follow-up visits as told by your health care provider. This is important.  How is this prevented?    · Get an annual flu shot. You may get the flu shot in late summer, fall, or winter. Ask your health care provider when you should get your flu shot.  · Avoid exposing others to your respiratory infection.  ? Stay home from work or school as told by your health care provider.  ? Wash your hands with soap and water often, especially after you cough or sneeze. If soap and water are not available, use alcohol-based hand .  · Avoid contact with people who are sick during cold and flu season. This is generally fall and winter.  Contact a health care provider if:  · Your symptoms last for 10 days or longer.  · Your symptoms get worse over time.  · You have a fever.  · You have severe sinus pain in your face or forehead.  · The glands in your jaw or neck become very swollen.  Get help right away if you:  · Feel pain or pressure in your chest.  · Have shortness of breath.  · Faint or feel like you will faint.  · Have severe and persistent vomiting.  · Feel confused or disoriented.  Summary  · A respiratory infection is an illness that affects part of the respiratory system, such as the lungs, nose, or throat. A respiratory infection that is caused by a virus is called a viral respiratory infection.  · Common types of viral respiratory infections are a cold, influenza, and respiratory syncytial virus (RSV) infection.  · Symptoms of this condition include a stuffy or runny nose, cough, sneezing, fatigue, achy muscles, sore throat, and fevers or chills.  · Antibiotic medicines are not prescribed for viral infections. This is because antibiotics are designed to kill bacteria. They are not effective against viruses.  This information is not intended to replace advice given to you by  your health care provider. Make sure you discuss any questions you have with your health care provider.  Document Released: 09/27/2006 Document Revised: 01/28/2019 Document Reviewed: 01/28/2019  [employer] Interactive Patient Education © 2019 [employer]  Contact Dermatitis  Dermatitis is redness, soreness, and swelling (inflammation) of the skin. Contact dermatitis is a reaction to certain substances that touch the skin. Many different substances can cause contact dermatitis. There are two types of contact dermatitis:  · Irritant contact dermatitis. This type is caused by something that irritates your skin, such as having dry hands from washing them too often with soap. This type does not require previous exposure to the substance for a reaction to occur. This is the most common type.  · Allergic contact dermatitis. This type is caused by a substance that you are allergic to, such as poison ivy. This type occurs when you have been exposed to the substance (allergen) and develop a sensitivity to it. Dermatitis may develop soon after your first exposure to the allergen, or it may not develop until the next time you are exposed and every time thereafter.  What are the causes?  Irritant contact dermatitis is most commonly caused by exposure to:  · Makeup.  · Soaps.  · Detergents.  · Bleaches.  · Acids.  · Metal salts, such as nickel.  Allergic contact dermatitis is most commonly caused by exposure to:  · Poisonous plants.  · Chemicals.  · Jewelry.  · Latex.  · Medicines.  · Preservatives in products, such as clothing.  What increases the risk?  You are more likely to develop this condition if you have:  · A job that exposes you to irritants or allergens.  · Certain medical conditions, such as asthma or eczema.  What are the signs or symptoms?  Symptoms of this condition may occur on your body anywhere the irritant has touched you or is touched by you.  · Symptoms include:  ? Dryness or  flaking.  ? Redness.  ? Cracks.  ? Itching.  ? Pain or a burning feeling.  ? Blisters.  ? Drainage of small amounts of blood or clear fluid from skin cracks.  With allergic contact dermatitis, there may also be swelling in areas such as the eyelids, mouth, or genitals.  How is this diagnosed?  This condition is diagnosed with a medical history and physical exam.  · A patch skin test may be performed to help determine the cause.  · If the condition is related to your job, you may need to see an occupational medicine specialist.  How is this treated?  This condition is treated by checking for the cause of the reaction and protecting your skin from further contact. Treatment may also include:  · Steroid creams or ointments. Oral steroid medicines may be needed in more severe cases.  · Antibiotic medicines or antibacterial ointments, if a skin infection is present.  · Antihistamine lotion or an antihistamine taken by mouth to ease itching.  · A bandage (dressing).  Follow these instructions at home:  Skin care  · Moisturize your skin as needed.  · Apply cool compresses to the affected areas.  · Try applying baking soda paste to your skin. Stir water into baking soda until it reaches a paste-like consistency.  · Do not scratch your skin, and avoid friction to the affected area.  · Avoid the use of soaps, perfumes, and dyes.  Medicines  · Take or apply over-the-counter and prescription medicines only as told by your health care provider.  · If you were prescribed an antibiotic medicine, take or apply the antibiotic as told by your health care provider. Do not stop using the antibiotic even if your condition improves.  Bathing  · Try taking a bath with:  ? Epsom salts. Follow the instructions on the packaging. You can get these at your local pharmacy or grocery store.  ? Baking soda. Pour a small amount into the bath as directed by your health care provider.  ? Colloidal oatmeal. Follow the instructions on the packaging.  You can get this at your local pharmacy or grocery store.  · Bathe less frequently, such as every other day.  · Bathe in lukewarm water. Avoid using hot water.  Bandage care  · If you were given a bandage (dressing), change it as told by your health care provider.  · Wash your hands with soap and water before and after you change your dressing. If soap and water are not available, use hand .  General instructions  · Avoid the substance that caused your reaction. If you do not know what caused it, keep a journal to try to track what caused it. Write down:  ? What you eat.  ? What cosmetic products you use.  ? What you drink.  ? What you wear in the affected area. This includes jewelry.  · Check the affected areas every day for signs of infection. Check for:  ? More redness, swelling, or pain.  ? More fluid or blood.  ? Warmth.  ? Pus or a bad smell.  · Keep all follow-up visits as told by your health care provider. This is important.  Contact a health care provider if:  · Your condition does not improve with treatment.  · Your condition gets worse.  · You have signs of infection such as swelling, tenderness, redness, soreness, or warmth in the affected area.  · You have a fever.  · You have new symptoms.  Get help right away if:  · You have a severe headache, neck pain, or neck stiffness.  · You vomit.  · You feel very sleepy.  · You notice red streaks coming from the affected area.  · Your bone or joint underneath the affected area becomes painful after the skin has healed.  · The affected area turns darker.  · You have difficulty breathing.  Summary  · Dermatitis is redness, soreness, and swelling (inflammation) of the skin. Contact dermatitis is a reaction to certain substances that touch the skin.  · Symptoms of this condition may occur on your body anywhere the irritant has touched you or is touched by you.  · This condition is treated by figuring out what caused the reaction and protecting your skin  from further contact. Treatment may also include medicines and skin care.  · Avoid the substance that caused your reaction. If you do not know what caused it, keep a journal to try to track what caused it.  · Contact a health care provider if your condition gets worse or you have signs of infection such as swelling, tenderness, redness, soreness, or warmth in the affected area.  This information is not intended to replace advice given to you by your health care provider. Make sure you discuss any questions you have with your health care provider.  Document Released: 12/15/2001 Document Revised: 07/03/2019 Document Reviewed: 07/03/2019  [employer] Interactive Patient Education © 2019 [employer]

## 2020-01-14 ENCOUNTER — TELEPHONE (OUTPATIENT)
Dept: FAMILY MEDICINE CLINIC | Facility: CLINIC | Age: 40
End: 2020-01-14

## 2020-01-14 ENCOUNTER — DOCUMENTATION (OUTPATIENT)
Dept: FAMILY MEDICINE CLINIC | Facility: CLINIC | Age: 40
End: 2020-01-14

## 2020-01-14 DIAGNOSIS — L28.2 PRURITIC RASH: Primary | ICD-10-CM

## 2020-01-14 DIAGNOSIS — L30.9 DERMATITIS: ICD-10-CM

## 2020-09-10 ENCOUNTER — OFFICE VISIT (OUTPATIENT)
Dept: FAMILY MEDICINE CLINIC | Facility: CLINIC | Age: 40
End: 2020-09-10

## 2020-09-10 VITALS
HEART RATE: 65 BPM | WEIGHT: 242 LBS | RESPIRATION RATE: 20 BRPM | OXYGEN SATURATION: 99 % | BODY MASS INDEX: 38.89 KG/M2 | SYSTOLIC BLOOD PRESSURE: 112 MMHG | DIASTOLIC BLOOD PRESSURE: 84 MMHG | TEMPERATURE: 97.8 F | HEIGHT: 66 IN

## 2020-09-10 DIAGNOSIS — M54.2 NECK PAIN: Primary | ICD-10-CM

## 2020-09-10 DIAGNOSIS — M25.562 ACUTE PAIN OF LEFT KNEE: ICD-10-CM

## 2020-09-10 DIAGNOSIS — J30.2 SEASONAL ALLERGIES: ICD-10-CM

## 2020-09-10 PROCEDURE — 99214 OFFICE O/P EST MOD 30 MIN: CPT | Performed by: NURSE PRACTITIONER

## 2020-09-10 RX ORDER — NABUMETONE 500 MG/1
500 TABLET, FILM COATED ORAL 2 TIMES DAILY PRN
Qty: 60 TABLET | Refills: 0 | Status: SHIPPED | OUTPATIENT
Start: 2020-09-10 | End: 2021-04-21

## 2020-09-10 RX ORDER — METHYLPREDNISOLONE 4 MG/1
TABLET ORAL
Qty: 1 EACH | Refills: 0 | Status: SHIPPED | OUTPATIENT
Start: 2020-09-10 | End: 2020-10-28

## 2020-09-10 RX ORDER — TIZANIDINE 4 MG/1
4 TABLET ORAL NIGHTLY PRN
Qty: 30 TABLET | Refills: 0 | Status: SHIPPED | OUTPATIENT
Start: 2020-09-10 | End: 2021-04-21

## 2020-09-10 NOTE — PROGRESS NOTES
"Subjective   Sara Edwards is a 39 y.o. female.     [facility] Same Day Appointment    PCP: DELPHINE Kahn    CC: \"neck pain and pain in knee, feel like I pulled something; itchy ears and lymph nodes tender\"    Neck Pain    This is a new problem. Episode onset: x 11 days. The problem occurs constantly. The problem has been unchanged. The pain is associated with an unknown factor. The pain is present in the left side. The quality of the pain is described as aching and cramping. The pain is moderate (with movement of neck, none at rest). Exacerbated by: tilting head down or turning to left. Pertinent negatives include no chest pain, fever, headaches, leg pain, numbness, pain with swallowing, paresis, photophobia, syncope, tingling, trouble swallowing, visual change, weakness or weight loss. Treatments tried: massage; heat; ice; lidoaine patch; old muscle relaxer. The treatment provided no relief.   Knee Pain    The incident occurred 3 to 5 days ago. The incident occurred at home. There was no injury mechanism. The pain is present in the left knee. The quality of the pain is described as aching (popliteal area--pulling pain). The pain has been fluctuating since onset. Pertinent negatives include no inability to bear weight, loss of motion, loss of sensation, muscle weakness, numbness or tingling. She reports no foreign bodies present. The symptoms are aggravated by movement (pain is intermittent). She has tried nothing for the symptoms. The treatment provided no relief.   URI    This is a new problem. The current episode started yesterday. The problem has been unchanged. There has been no fever. Associated symptoms include congestion ( in am), neck pain and swollen glands (yesterday, better today). Pertinent negatives include no abdominal pain, chest pain, coughing, diarrhea, dysuria, ear pain ( no pain, itchy only), headaches, joint pain, joint swelling, nausea, plugged ear sensation, rash, rhinorrhea, sinus pain, " "sneezing, sore throat, vomiting or wheezing. She has tried nothing (has Claritin at home, not taking) for the symptoms. The treatment provided no relief.        The following portions of the patient's history were reviewed and updated as appropriate: allergies, current medications, past medical history, past social history, past surgical history and problem list.    Review of Systems   Constitutional: Negative.  Negative for fever and unexpected weight loss.   HENT: Positive for congestion ( in am), postnasal drip and swollen glands (yesterday, better today). Negative for ear discharge, ear pain ( no pain, itchy only), rhinorrhea, sinus pressure, sneezing, sore throat and trouble swallowing.    Eyes: Negative.  Negative for photophobia.   Respiratory: Negative.  Negative for cough and wheezing.    Cardiovascular: Negative.  Negative for chest pain and syncope.   Gastrointestinal: Negative for abdominal pain, diarrhea, nausea and vomiting.   Genitourinary: Negative for dysuria.   Musculoskeletal: Positive for arthralgias (left knee-popliteal) and neck pain. Negative for joint pain.   Skin: Negative for rash.   Neurological: Negative for dizziness, tingling, weakness, numbness and headache.     /84 (BP Location: Right arm, Patient Position: Sitting, Cuff Size: Large Adult)   Pulse 65   Temp 97.8 °F (36.6 °C) (Temporal)   Resp 20   Ht 167.6 cm (66\")   Wt 110 kg (242 lb)   LMP 09/01/2020   SpO2 99%   Breastfeeding No   BMI 39.06 kg/m²     Objective   Physical Exam   Constitutional: She is oriented to person, place, and time. She appears well-developed and well-nourished. No distress.   HENT:   Head: Normocephalic and atraumatic.   Right Ear: Ear canal normal. Tympanic membrane is not erythematous. A middle ear effusion (small) is present.   Left Ear: Tympanic membrane and ear canal normal. Tympanic membrane is not erythematous.  No middle ear effusion.   Nose: Mucosal edema (pale, boggy) and congestion " (mostly right side) present. Right sinus exhibits no maxillary sinus tenderness and no frontal sinus tenderness. Left sinus exhibits no maxillary sinus tenderness and no frontal sinus tenderness.   Mouth/Throat: Uvula is midline, oropharynx is clear and moist and mucous membranes are normal.   +clear PND   Eyes: Conjunctivae are normal. Right eye exhibits no discharge. Left eye exhibits no discharge.   Neck: Neck supple.   Cardiovascular: Normal rate and regular rhythm.   Pulmonary/Chest: Effort normal and breath sounds normal. She has no wheezes. She has no rales.   Musculoskeletal: She exhibits tenderness.        Left knee: She exhibits normal range of motion and no swelling. Tenderness ( popliteal area) found.        Cervical back: She exhibits decreased range of motion ( difficultry with flexion and turning to left), tenderness, swelling (mild) and spasm (left upper back/trapezius/neck).   +crepitus left knee     Lymphadenopathy:     She has no cervical adenopathy.   Neurological: She is alert and oriented to person, place, and time.   Skin: Skin is warm and dry. No rash noted. No erythema.   Nursing note and vitals reviewed.    No results found for this or any previous visit (from the past 24 hour(s)).  No Images in the past 120 days found..      Assessment/Plan   Sara was seen today for neck pain and knee pain.    Diagnoses and all orders for this visit:    Neck pain  -     nabumetone (RELAFEN) 500 MG tablet; Take 1 tablet by mouth 2 (Two) Times a Day As Needed for Mild Pain .  -     tiZANidine (ZANAFLEX) 4 MG tablet; Take 1 tablet by mouth At Night As Needed for Muscle Spasms (neck pain).  -     methylPREDNISolone (MEDROL) 4 MG dose pack; Take as directed on package instructions.  -     XR Spine Cervical Complete 4 or 5 View (In Office)    Acute pain of left knee  -     nabumetone (RELAFEN) 500 MG tablet; Take 1 tablet by mouth 2 (Two) Times a Day As Needed for Mild Pain .  -     tiZANidine (ZANAFLEX) 4  MG tablet; Take 1 tablet by mouth At Night As Needed for Muscle Spasms (neck pain).  -     methylPREDNISolone (MEDROL) 4 MG dose pack; Take as directed on package instructions.  -     XR knee 4+ vw left    Seasonal allergies             Answers for HPI/ROS submitted by the patient on 9/9/2020   What is the primary reason for your visit?: Other  Please describe your symptoms.: Feels like pulled muscle in my neck. Shooting pain thru shoulder and back. Also lymph nods are tender around my ears and are painful to touch.  Have you had these symptoms before?: No  How long have you been having these symptoms?: 1-2 weeks  Please list any medications you are currently taking for this condition.: Tylenol. Used ice and heating pads, over the counter pain patches, and bengay  Please describe any probable cause for these symptoms. : Unknown. Woke up with this almost 11 days ago. Thought it would work itself out but hasnt    Xrays of cervical spine/knee on the way out today--will call with results when available  Rx for Relafen, Zanaflex, Medrol provided--declined injections in office  Moist heat/muscle rubs as needed  Has Knee brace at home as needed    Restart Claritin daily for allergy symptoms    Patient's Body mass index is 39.06 kg/m². BMI is above normal parameters. Recommendations include: referral to primary care.    See PCP or RTC if symptoms persist/worsen  See PCP for routine f/u visit and management of chronic medical conditions      This document has been electronically signed by DELPHINE Keith on September 10, 2020 15:09,.

## 2020-09-30 DIAGNOSIS — M54.2 NECK PAIN: Primary | ICD-10-CM

## 2020-10-07 ENCOUNTER — TREATMENT (OUTPATIENT)
Dept: PHYSICAL THERAPY | Facility: CLINIC | Age: 40
End: 2020-10-07

## 2020-10-07 DIAGNOSIS — M54.2 CERVICALGIA: Primary | ICD-10-CM

## 2020-10-07 PROCEDURE — 97162 PT EVAL MOD COMPLEX 30 MIN: CPT | Performed by: PHYSICAL THERAPIST

## 2020-10-07 NOTE — PROGRESS NOTES
Physical Therapy Initial Evaluation and Plan of Care      Patient: Sara Edwards   : 1980  Diagnosis/ICD-10 Code:  Cervicalgia [M54.2]  Referring practitioner: DELPHINE Benson  Date of Initial Visit: 10/7/2020  Today's Date: 10/7/2020  Patient seen for 1 sessions    Next MD appt: 10/28/2020.  Recertification: 10/28/2020          Subjective Questionnaire: NDI:50 = 22%      Subjective Evaluation    History of Present Illness  Onset date: Over a month, about a month and a half.    Subjective comment: Patient rpeorts she woke up one morning with what was a crick in her neck. She reports it hasn't been worsening, just not going away.  Patient Occupation: [employer] [address]   Precautions and Work Restrictions: No missed daysQuality of life: good    Pain  Current pain ratin  At best pain ratin  At worst pain ratin  Location: neck  Quality: throbbing, tight, pulling and radiating  Relieving factors: rest and medications  Aggravating factors: movement, overhead activity, lifting, repetitive movement, outstretched reach and prolonged positioning (Looking to the L.)  Progression: no change    Social Support  Lives with: young children (3 teenagers)    Hand dominance: right    Diagnostic Tests  X-ray: normal    Treatments  Current treatment: medication  Patient Goals  Patient goals for therapy: decreased pain, increased motion, increased strength, independence with ADLs/IADLs, return to sport/leisure activities and return to work             Objective          Static Posture   General Observations  Guarded.     Head  Forward.    Cervical Spine  Shifted right.    Shoulders  Elevated and rounded.    Thoracic Spine  Hyperkyphosis.    Postural Observations  Seated posture: poor  Standing posture: poor        Palpation   Left   Hypertonic in the upper trapezius.   Tenderness of the suboccipitals and upper trapezius.     Right   Hypertonic in the levator scapulae, sternocleidomastoid and  upper trapezius. Tenderness of the levator scapulae, sternocleidomastoid, suboccipitals and upper trapezius.   Trigger point to upper trapezius.     Tenderness   Cervical Spine   Tenderness in the facet joint.     Additional Tenderness Details  R C5    Neurological Testing     Sensation   Cervical/Thoracic   Left   Intact: light touch    Right   Intact: light touch    Active Range of Motion   Cervical/Thoracic Spine   Cervical    Flexion: 62 degrees   Extension: 52 degrees   Left lateral flexion: 15 degrees with pain  Right lateral flexion: 32 degrees   Left rotation: 62 degrees   Right rotation: 75 degrees     Strength/Myotome Testing   Cervical Spine   Neck extension: 4+  Neck flexion: 4+    Left   Neck lateral flexion (C3): 4    Right   Neck lateral flexion (C3): 4+    Left Shoulder     Planes of Motion   Flexion: 4+   Abduction: 4+     Right Shoulder     Planes of Motion   Flexion: 4+   Abduction: 5     Tests   Cervical     Left   Positive active compression ([place]).   Negative alar ligament integrity, cervical distraction, Spurling's sign and transverse ligament.     Right   Negative alar ligament integrity, active compression ([place]), cervical distraction, Spurling's sign and transverse ligament.     Additional Tests Details  Negative vertebral artery B.    Ambulation     Comments   FWB, non-antalgic gait, no distress, no assistive device, no significant gait deviation, normal arm swing with gait.          Assessment & Plan     Assessment  Impairments: abnormal or restricted ROM, activity intolerance, impaired physical strength, lacks appropriate home exercise program and pain with function  Assessment details: Patient has new onset of c-spine pain with some malalignment., Corrected with ROT ME and mild lateral mobs. She has defcits in ROM and strength du to pain and also some muscular tightness too. Patient's insurance requires authorization prior to treatment beginning. Small HEP was  established.  Prognosis: fair  Prognosis details: Barriers to Rehab: Include significant or possible arthritic/degenerative changes that have occurred within the spine.    Safety Issues: None noted.    Functional Limitations: carrying objects, lifting, sleeping, pulling, pushing, uncomfortable because of pain, sitting and reaching overhead  Goals  Plan Goals: Short Term Goals:  1) I with HEP and have additions/changes by next re-certification.    2) Patient to be more aware of posture and posture correction technique.    3) AROM cervical extension >=60°.    4) AROM B cervical SB >= 30°.    5) AROM B cervical ROT >=75°.    6) C-spine >= 4+/5.      Long Term Goals:  1) AROM for the cervical spine all WNL, no increase in pain.    2) B UE 5/5, no increase in pain.    3) C-spine 5/5, no increase in pain.    4) Patient able to perform 10 minutes of scapular stabilization exercises with good posture and no cueing to correct.    5) Patient able to control s/s with there ex.    6) NDI score of <= 12%.    7) I with final HEP.        Plan  Therapy options: will be seen for skilled physical therapy services  Planned modality interventions: cryotherapy, high voltage pulsed current (pain management) and thermotherapy (hydrocollator packs)  Planned therapy interventions: flexibility, body mechanics training, home exercise program, joint mobilization, manual therapy, neuromuscular re-education, postural training, soft tissue mobilization, spinal/joint mobilization, strengthening, stretching and therapeutic activities  Treatment plan discussed with: patient  Plan details: Progress overall ROM, strength, posture, scap stab, neural extensibility, ergonomics, and monitor cervical spine alignment.      Other therapeutic activities and/or exercises will be prescribed depending on the patients progress or lack there of.     Visit Diagnoses:    ICD-10-CM ICD-9-CM   1. Cervicalgia  M54.2 723.1       Timed:  Manual Therapy:         mins   18094;  Therapeutic Exercise:         mins  30704;     Aquatic Ther Ex:         mins  07294;     Neuromuscular Bill:        mins  48366;    Therapeutic Activity:          mins  31630;     Gait Training:           mins  58557;     Ultrasound:          mins  89725;    Paraffin:        mins  55128;  Electrical Stimulation:         mins  38607 ( );    Untimed:  Electrical Stimulation:         mins  38887 ( );  Mechanical Traction:         mins  05536;     Timed Treatment:      mins   Total Treatment:     25   mins    PT SIGNATURE: Soha Min PT DPT, [facility]   DATE TREATMENT INITIATED: 10/7/2020    Initial Certification  Certification Period: 1/5/2021  I certify that the therapy services are furnished while this patient is under my care.  The services outlined above are required by this patient, and will be reviewed every 90 days.     PHYSICIAN: Swapnil Jerome, APRN      DATE:     Please sign and return via fax to  .. Thank you, [facility] Physical Therapy.

## 2020-10-12 ENCOUNTER — TELEPHONE (OUTPATIENT)
Dept: PHYSICAL THERAPY | Facility: CLINIC | Age: 40
End: 2020-10-12

## 2020-10-12 NOTE — TELEPHONE ENCOUNTER
Patient called to cancel same day appointment due to family matters. Will keep appointment later in the week. Did not wish to reschedule.

## 2020-10-14 ENCOUNTER — TELEPHONE (OUTPATIENT)
Dept: PHYSICAL THERAPY | Facility: CLINIC | Age: 40
End: 2020-10-14

## 2020-10-27 ENCOUNTER — TELEPHONE (OUTPATIENT)
Dept: FAMILY MEDICINE CLINIC | Facility: CLINIC | Age: 40
End: 2020-10-27

## 2020-10-27 ENCOUNTER — TREATMENT (OUTPATIENT)
Dept: PHYSICAL THERAPY | Facility: CLINIC | Age: 40
End: 2020-10-27

## 2020-10-27 DIAGNOSIS — M54.2 CERVICALGIA: Primary | ICD-10-CM

## 2020-10-27 PROCEDURE — 97110 THERAPEUTIC EXERCISES: CPT | Performed by: PHYSICAL THERAPIST

## 2020-10-27 NOTE — PROGRESS NOTES
Physical Therapy Daily Progress Note    Patient: Sara Edwards   : 1980  Diagnosis/ICD-10 Code:     Diagnosis Plan   1. Cervicalgia       Referring practitioner: DELPHINE Benson  Date of Initial Visit: Type: THERAPY  Noted: 10/7/2020  Today's Date: 10/27/2020  Patient seen for 2 sessions      PT Recheck Due: 10-  PT MD Visit: 10-       Sara Edwards       Subjective Evaluation    History of Present Illness    Subjective comment: reports that her mother was in the hospital which is why she wasn't at therapyPain  Current pain ratin             Objective   See Exercise, Manual, and Modality Logs for complete treatment.       Assessment & Plan     Assessment  Assessment details: Patient has had limited visits at this time. Patient needs cueing for previously instructed HEP. Gives good effort. Cueing for posture    Goals  Plan Goals: Short Term Goals:  1) I with HEP and have additions/changes by next re-certification.    2) Patient to be more aware of posture and posture correction technique.    3) AROM cervical extension >=60°.    4) AROM B cervical SB >= 30°.    5) AROM B cervical ROT >=75°.    6) C-spine >= 4+/5.      Long Term Goals:  1) AROM for the cervical spine all WNL, no increase in pain.    2) B UE 5/5, no increase in pain.    3) C-spine 5/5, no increase in pain.    4) Patient able to perform 10 minutes of scapular stabilization exercises with good posture and no cueing to correct.    5) Patient able to control s/s with there ex.    6) NDI score of <= 12%.    7) I with final HEP.    Plan  Plan details: Next visit possible recheck, progress scap stability      Progress per Plan of Care and Progress strengthening /stabilization /functional activity            Timed:  Manual Therapy:         mins  76495;  Therapeutic Exercise:    45     mins  95008;   Aquatic Therex :        mins  89038    Neuromuscular Bill:        mins  16621;    Therapeutic Activity:          mins   97564;     Gait Training:           mins  95551;     Ultrasound:          mins  43758;    Electrical Stimulation:         mins  72115 ( );    Untimed:  Electrical Stimulation:         mins  65414 ( );  Mechanical Traction:         mins  12577;   Orthotic fit/train:         mins  31538    Timed Treatment:   45   mins   Total Treatment:     45   mins  Karissa Garrett [facility]  Physical Therapist Assistant

## 2020-10-27 NOTE — TELEPHONE ENCOUNTER
Tried calling to confirm appointment remind to wear mask and prescreen. No answer left voice message with instructions regarding fasting labs.  HUB to read

## 2020-10-28 ENCOUNTER — PATIENT MESSAGE (OUTPATIENT)
Dept: FAMILY MEDICINE CLINIC | Facility: CLINIC | Age: 40
End: 2020-10-28

## 2020-10-28 ENCOUNTER — LAB (OUTPATIENT)
Dept: LAB | Facility: HOSPITAL | Age: 40
End: 2020-10-28

## 2020-10-28 ENCOUNTER — OFFICE VISIT (OUTPATIENT)
Dept: FAMILY MEDICINE CLINIC | Facility: CLINIC | Age: 40
End: 2020-10-28

## 2020-10-28 VITALS
RESPIRATION RATE: 20 BRPM | TEMPERATURE: 96.8 F | DIASTOLIC BLOOD PRESSURE: 70 MMHG | BODY MASS INDEX: 38.31 KG/M2 | OXYGEN SATURATION: 98 % | WEIGHT: 238.4 LBS | HEART RATE: 68 BPM | SYSTOLIC BLOOD PRESSURE: 110 MMHG | HEIGHT: 66 IN

## 2020-10-28 DIAGNOSIS — E55.9 VITAMIN D DEFICIENCY: Primary | ICD-10-CM

## 2020-10-28 DIAGNOSIS — Z11.59 ENCOUNTER FOR HEPATITIS C SCREENING TEST FOR LOW RISK PATIENT: ICD-10-CM

## 2020-10-28 DIAGNOSIS — E78.2 MIXED HYPERLIPIDEMIA: ICD-10-CM

## 2020-10-28 DIAGNOSIS — Z13.29 SCREENING FOR THYROID DISORDER: ICD-10-CM

## 2020-10-28 DIAGNOSIS — I10 BENIGN ESSENTIAL HTN: ICD-10-CM

## 2020-10-28 DIAGNOSIS — Z12.31 ENCOUNTER FOR SCREENING MAMMOGRAM FOR BREAST CANCER: ICD-10-CM

## 2020-10-28 DIAGNOSIS — Z13.220 SCREENING FOR LIPOID DISORDERS: ICD-10-CM

## 2020-10-28 DIAGNOSIS — Z00.00 WELL ADULT EXAM: Primary | ICD-10-CM

## 2020-10-28 DIAGNOSIS — Z13.1 SCREENING FOR DIABETES MELLITUS: ICD-10-CM

## 2020-10-28 DIAGNOSIS — IMO0002 CHRONIC MIGRAINE: ICD-10-CM

## 2020-10-28 LAB
25(OH)D3 SERPL-MCNC: 27.7 NG/ML (ref 30–100)
ALBUMIN SERPL-MCNC: 4 G/DL (ref 3.5–5.2)
ALBUMIN/GLOB SERPL: 1.1 G/DL
ALP SERPL-CCNC: 47 U/L (ref 39–117)
ALT SERPL W P-5'-P-CCNC: 16 U/L (ref 1–33)
ANION GAP SERPL CALCULATED.3IONS-SCNC: 10.7 MMOL/L (ref 5–15)
AST SERPL-CCNC: 21 U/L (ref 1–32)
BASOPHILS # BLD AUTO: 0.05 10*3/MM3 (ref 0–0.2)
BASOPHILS NFR BLD AUTO: 0.8 % (ref 0–1.5)
BILIRUB SERPL-MCNC: 0.3 MG/DL (ref 0–1.2)
BUN SERPL-MCNC: 13 MG/DL (ref 6–20)
BUN/CREAT SERPL: 17.1 (ref 7–25)
CALCIUM SPEC-SCNC: 9.4 MG/DL (ref 8.6–10.5)
CHLORIDE SERPL-SCNC: 107 MMOL/L (ref 98–107)
CHOLEST SERPL-MCNC: 188 MG/DL (ref 0–200)
CO2 SERPL-SCNC: 25.3 MMOL/L (ref 22–29)
CREAT SERPL-MCNC: 0.76 MG/DL (ref 0.57–1)
DEPRECATED RDW RBC AUTO: 40.9 FL (ref 37–54)
EOSINOPHIL # BLD AUTO: 0.18 10*3/MM3 (ref 0–0.4)
EOSINOPHIL NFR BLD AUTO: 2.8 % (ref 0.3–6.2)
ERYTHROCYTE [DISTWIDTH] IN BLOOD BY AUTOMATED COUNT: 13.4 % (ref 12.3–15.4)
GFR SERPL CREATININE-BSD FRML MDRD: 84 ML/MIN/1.73
GLOBULIN UR ELPH-MCNC: 3.6 GM/DL
GLUCOSE SERPL-MCNC: 97 MG/DL (ref 65–99)
HBA1C MFR BLD: 5.7 % (ref 4.8–5.6)
HCT VFR BLD AUTO: 40 % (ref 34–46.6)
HCV AB SER DONR QL: NORMAL
HDLC SERPL-MCNC: 54 MG/DL (ref 40–60)
HGB BLD-MCNC: 13.6 G/DL (ref 12–15.9)
IMM GRANULOCYTES # BLD AUTO: 0.01 10*3/MM3 (ref 0–0.05)
IMM GRANULOCYTES NFR BLD AUTO: 0.2 % (ref 0–0.5)
LDLC SERPL CALC-MCNC: 124 MG/DL (ref 0–100)
LDLC/HDLC SERPL: 2.3 {RATIO}
LYMPHOCYTES # BLD AUTO: 2.06 10*3/MM3 (ref 0.7–3.1)
LYMPHOCYTES NFR BLD AUTO: 31.5 % (ref 19.6–45.3)
MCH RBC QN AUTO: 28.4 PG (ref 26.6–33)
MCHC RBC AUTO-ENTMCNC: 34 G/DL (ref 31.5–35.7)
MCV RBC AUTO: 83.5 FL (ref 79–97)
MONOCYTES # BLD AUTO: 0.58 10*3/MM3 (ref 0.1–0.9)
MONOCYTES NFR BLD AUTO: 8.9 % (ref 5–12)
NEUTROPHILS NFR BLD AUTO: 3.66 10*3/MM3 (ref 1.7–7)
NEUTROPHILS NFR BLD AUTO: 55.8 % (ref 42.7–76)
NRBC BLD AUTO-RTO: 0 /100 WBC (ref 0–0.2)
PLATELET # BLD AUTO: 350 10*3/MM3 (ref 140–450)
PMV BLD AUTO: 11.9 FL (ref 6–12)
POTASSIUM SERPL-SCNC: 4.9 MMOL/L (ref 3.5–5.2)
PROT SERPL-MCNC: 7.6 G/DL (ref 6–8.5)
RBC # BLD AUTO: 4.79 10*6/MM3 (ref 3.77–5.28)
SODIUM SERPL-SCNC: 143 MMOL/L (ref 136–145)
TRIGL SERPL-MCNC: 50 MG/DL (ref 0–150)
TSH SERPL DL<=0.05 MIU/L-ACNC: 2.69 UIU/ML (ref 0.27–4.2)
VLDLC SERPL-MCNC: 10 MG/DL (ref 5–40)
WBC # BLD AUTO: 6.54 10*3/MM3 (ref 3.4–10.8)

## 2020-10-28 PROCEDURE — 85025 COMPLETE CBC W/AUTO DIFF WBC: CPT | Performed by: NURSE PRACTITIONER

## 2020-10-28 PROCEDURE — 80061 LIPID PANEL: CPT | Performed by: NURSE PRACTITIONER

## 2020-10-28 PROCEDURE — 82306 VITAMIN D 25 HYDROXY: CPT | Performed by: NURSE PRACTITIONER

## 2020-10-28 PROCEDURE — 99396 PREV VISIT EST AGE 40-64: CPT | Performed by: NURSE PRACTITIONER

## 2020-10-28 PROCEDURE — 86803 HEPATITIS C AB TEST: CPT | Performed by: NURSE PRACTITIONER

## 2020-10-28 PROCEDURE — 84443 ASSAY THYROID STIM HORMONE: CPT | Performed by: NURSE PRACTITIONER

## 2020-10-28 PROCEDURE — 80053 COMPREHEN METABOLIC PANEL: CPT | Performed by: NURSE PRACTITIONER

## 2020-10-28 PROCEDURE — 83036 HEMOGLOBIN GLYCOSYLATED A1C: CPT | Performed by: NURSE PRACTITIONER

## 2020-10-29 ENCOUNTER — TREATMENT (OUTPATIENT)
Dept: PHYSICAL THERAPY | Facility: CLINIC | Age: 40
End: 2020-10-29

## 2020-10-29 DIAGNOSIS — M54.2 CERVICALGIA: Primary | ICD-10-CM

## 2020-10-29 PROCEDURE — 97110 THERAPEUTIC EXERCISES: CPT | Performed by: PHYSICAL THERAPIST

## 2020-10-29 RX ORDER — ERGOCALCIFEROL 1.25 MG/1
50000 CAPSULE ORAL WEEKLY
Qty: 12 CAPSULE | Refills: 3 | Status: SHIPPED | OUTPATIENT
Start: 2020-10-29

## 2020-10-29 NOTE — TELEPHONE ENCOUNTER
From: Sara Edwards  To: DELPHINE Flores  Sent: 10/28/2020 8:56 AM CDT  Subject: Prescription Question    I forgot to ask about the Vitamin D. I use to take one pill once a week. Is there anyway to do that again. I know the blood work is gonna show it's low because I have an issue remembering to take it daily.

## 2020-10-29 NOTE — PROGRESS NOTES
Physical Therapy Daily Progress Note    Patient: Sara Edwards   : 1980  Diagnosis/ICD-10 Code:     Diagnosis Plan   1. Cervicalgia       Referring practitioner: DELPHINE Benson  Date of Initial Visit: Type: THERAPY  Noted: 10/7/2020  Today's Date: 10/29/2020  Patient seen for 3 sessions      PT Recheck Due: 10-  PT MD Visit: TBD       Sara Edwards reports: 70%        Subjective Evaluation    History of Present Illness    Subjective comment: states that she doesn't have any pain right now. Pain  Current pain ratin             Objective          Active Range of Motion   Cervical/Thoracic Spine   Cervical    Flexion: 56 degrees   Extension: 55 degrees   Left lateral flexion: 40 degrees   Right lateral flexion: 43 degrees   Left rotation: 84 degrees   Right rotation: 86 degrees       See Exercise, Manual, and Modality Logs for complete treatment.       Assessment & Plan     Assessment  Assessment details: Patient has significant improvements in AROM of cspine all planes this date with patient demonstrating normal cspine ROM. Postural cueing. Patient is receptive to cues and instructions.     Goals  Plan Goals: Short Term Goals:  1) I with HEP and have additions/changes by next re-certification.    2) Patient to be more aware of posture and posture correction technique.    3) AROM cervical extension >=60°. (met)    4) AROM B cervical SB >= 30°. (met)    5) AROM B cervical ROT >=75°. (met)    6) C-spine >= 4+/5.      Long Term Goals:  1) AROM for the cervical spine all WNL, no increase in pain. (met)    2) B UE 5/5, no increase in pain.    3) C-spine 5/5, no increase in pain.    4) Patient able to perform 10 minutes of scapular stabilization exercises with good posture and no cueing to correct.    5) Patient able to control s/s with there ex.     6) NDI score of <= 12%.    7) I with final HEP.    Plan  Plan details: Recheck next visit      Progress per Plan of Care and Progress  strengthening /stabilization /functional activity            Timed:  Manual Therapy:         mins  96581;  Therapeutic Exercise:    45     mins  54903;   Aquatic Therex :        mins  84346    Neuromuscular Bill:        mins  89075;    Therapeutic Activity:          mins  52977;     Gait Training:           mins  96871;     Ultrasound:          mins  25173;    Electrical Stimulation:         mins  02749 ( );    Untimed:  Electrical Stimulation:         mins  78817 ( );  Mechanical Traction:         mins  74601;   Orthotic fit/train:         mins  92233    Timed Treatment:   45   mins   Total Treatment:     45   mins  Karissa Garrett [facility]  Physical Therapist Assistant

## 2020-11-02 ENCOUNTER — TREATMENT (OUTPATIENT)
Dept: PHYSICAL THERAPY | Facility: CLINIC | Age: 40
End: 2020-11-02

## 2020-11-02 DIAGNOSIS — M54.2 CERVICALGIA: Primary | ICD-10-CM

## 2020-11-02 PROCEDURE — 97140 MANUAL THERAPY 1/> REGIONS: CPT | Performed by: PHYSICAL THERAPIST

## 2020-11-02 PROCEDURE — 97110 THERAPEUTIC EXERCISES: CPT | Performed by: PHYSICAL THERAPIST

## 2020-11-02 NOTE — PROGRESS NOTES
"Re-Assessment / Re-Certification      Patient: Sara Edwards   : 1980  Diagnosis/ICD-10 Code:  Cervicalgia [M54.2]  Referring practitioner: DELPHINE Benson  Date of Initial Visit: Type: THERAPY  Noted: 10/7/2020  Today's Date: 2020  Patient seen for 4 sessions    Next MD appt: 10/29/2021.  Recertification: 2020, if needed    Subjective:   Sara Edwards reports: 70% improvement  Subjective Questionnaire: NDI:4/50 = 8%  Clinical Progress: improved  Home Program Compliance: Yes  Treatment has included: therapeutic exercise, neuromuscular re-education, manual therapy and therapeutic activity    Subjective Evaluation    History of Present Illness    Subjective comment: Patient reports no real pain, just mainly soreness. She reports one main spot in her L upper shoulder/neck is bothering her.Pain  Pain scale: \"sore\"         Objective          Static Posture   General Observations  Guarded.     Head  Forward.    Cervical Spine  Shifted right.    Shoulders  Elevated and rounded.    Thoracic Spine  Hyperkyphosis.    Postural Observations  Seated posture: fair  Standing posture: fair        Palpation   Left   Hypertonic in the levator scapulae and upper trapezius.   Tenderness of the levator scapulae and upper trapezius.   Trigger point to levator scapulae and upper trapezius.     Tenderness   Cervical Spine   Tenderness in the facet joint.     Additional Tenderness Details  R C5    Neurological Testing     Sensation   Cervical/Thoracic   Left   Intact: light touch    Right   Intact: light touch    Active Range of Motion   Cervical/Thoracic Spine   Cervical    Flexion: 62 degrees   Extension: 55 degrees   Left lateral flexion: 45 degrees   Right lateral flexion: 42 degrees   Left rotation: 82 degrees   Right rotation: 90 degrees     Strength/Myotome Testing   Cervical Spine   Neck extension: 4+  Neck flexion: 4+    Left   Neck lateral flexion (C3): 5    Right   Neck lateral flexion (C3): " 5    Left Shoulder     Planes of Motion   Flexion: 5   Abduction: 5     Right Shoulder     Planes of Motion   Flexion: 5   Abduction: 5     Tests   Cervical     Left   Negative active compression ([place]), cervical distraction and Spurling's sign.     Right   Negative active compression ([place]), cervical distraction and Spurling's sign.     Ambulation     Comments   FWB, non-antalgic gait, no distress, no assistive device, no significant gait deviation, normal arm swing with gait.      Assessment & Plan     Assessment  Impairments: abnormal or restricted ROM, activity intolerance, impaired physical strength, lacks appropriate home exercise program and pain with function  Assessment details: Significant tightness in the L UR/LS today. Patient responded well to MFR and trigger point release through applied trigger point pressure. Responded well to manual therapy with less triggering and tension post treatment. Patient now has normal ROM and strength and progressing well with there ex. Still has lack of scapular stabilizers when performing activities with UE, L>R.  Prognosis: fair  Prognosis details: Barriers to Rehab: Include significant or possible arthritic/degenerative changes that have occurred within the spine.    Safety Issues: None noted.    Functional Limitations: carrying objects, lifting, sleeping, pulling, pushing, uncomfortable because of pain, sitting and reaching overhead  Goals  Plan Goals: Short Term Goals:  1) I with HEP and have additions/changes by next re-certification. (met)    2) Patient to be more aware of posture and posture correction technique. (met)    3) AROM cervical extension >=60°. (met)    4) AROM B cervical SB >= 30°. (met)    5) AROM B cervical ROT >=75°. (met)    6) C-spine >= 4+/5. (met)      Long Term Goals:  1) AROM for the cervical spine all WNL, no increase in pain. (met)    2) B UE 5/5, no increase in pain. (partially met)    3) C-spine 5/5, no increase in pain. (met)    4)  Patient able to perform 10 minutes of scapular stabilization exercises with good posture and no cueing to correct.    5) Patient able to control s/s with there ex.     6) NDI score of <= 12%. (met)    7) I with final HEP.    Plan  Therapy options: will be seen for skilled physical therapy services  Planned modality interventions: cryotherapy, high voltage pulsed current (pain management) and thermotherapy (hydrocollator packs)  Planned therapy interventions: flexibility, body mechanics training, home exercise program, joint mobilization, manual therapy, neuromuscular re-education, postural training, soft tissue mobilization, spinal/joint mobilization, strengthening, stretching and therapeutic activities  Treatment plan discussed with: patient  Plan details: Progress scap stab and continue with manual as indicated.      Other therapeutic activities and/or exercises will be prescribed depending on the patients progress or lack there of.     Visit Diagnoses:    ICD-10-CM ICD-9-CM   1. Cervicalgia  M54.2 723.1       Progress toward previous goals: Partially Met        Recommendations: Continue with recommendations scap stab and manual  Timeframe:2- 3 weeks  Prognosis to achieve goals: good    PT Signature: Soha Min, PT DPT, CSCS      Based upon review of the patient's progress and continued therapy plan, it is my medical opinion that Sara Edwards should continue physical therapy treatment at [facility]  [address] [place] [address] [phone].    Signature: __________________________________  Swapnil Jerome APRN    Timed:  Manual Therapy:    10     mins  44872;  Therapeutic Exercise:    30     mins  64827;     Aquatic Ther Ex:         mins  68241;     Neuromuscular Bill:        mins  43408;    Therapeutic Activity:     8     mins  59749;     Gait Training:           mins  46965;     Ultrasound:          mins  25585;    Paraffin:        mins   09406;  Electrical Stimulation:         mins  85522 ( );    Untimed:  Electrical Stimulation:         mins  54132 ( );  Mechanical Traction:         mins  83396;     Timed Treatment:   48   mins   Total Treatment:     48   mins

## 2020-11-04 ENCOUNTER — TELEPHONE (OUTPATIENT)
Dept: PHYSICAL THERAPY | Facility: CLINIC | Age: 40
End: 2020-11-04

## 2020-11-04 DIAGNOSIS — M54.2 CERVICALGIA: Primary | ICD-10-CM

## 2020-11-05 ENCOUNTER — TREATMENT (OUTPATIENT)
Dept: PHYSICAL THERAPY | Facility: CLINIC | Age: 40
End: 2020-11-05

## 2020-11-05 DIAGNOSIS — M54.2 CERVICALGIA: Primary | ICD-10-CM

## 2020-11-05 PROCEDURE — 97140 MANUAL THERAPY 1/> REGIONS: CPT | Performed by: PHYSICAL THERAPIST

## 2020-11-05 PROCEDURE — 97110 THERAPEUTIC EXERCISES: CPT | Performed by: PHYSICAL THERAPIST

## 2020-11-05 NOTE — PROGRESS NOTES
Physical Therapy Daily Progress Note      Patient: Sara Edwards   : 1980  Referring practitioner: DELPHINE Benson  Date of Initial Visit: Type: THERAPY  Noted: 10/7/2020  Today's Date: 2020  Patient seen for 5 sessions    Next MD appt: 10/29/2021.  Recertification: 2020, if needed         Sara Edwards reports: 70% improvement        Subjective Evaluation    History of Present Illness    Subjective comment: pt states that she is doing well. States no pain and states that she has made improvement since starting therapy- especially with cspine rotation. States that she has a large knot on L UT that bothers her.Pain  Current pain ratin           Objective          Static Posture     Comments  Fwd head/ rounded shoulder. Intermittent cues for posture      See Exercise, Manual, and Modality Logs for complete treatment.       Assessment & Plan     Assessment  Assessment details: Pt did well with treatment. Little to no pain reported throughout. Pt required cues for UT S as pt tended to rotate head. Pt also required cues for form with tband clocks. Pt is tender to L UT with manual.     Goals  Plan Goals: Short Term Goals:  1) I with HEP and have additions/changes by next re-certification. (met)    2) Patient to be more aware of posture and posture correction technique. (met)    3) AROM cervical extension >=60°. (met)    4) AROM B cervical SB >= 30°. (met)    5) AROM B cervical ROT >=75°. (met)    6) C-spine >= 4+/5. (met)      Long Term Goals:  1) AROM for the cervical spine all WNL, no increase in pain. (met)    2) B UE 5/5, no increase in pain. (partially met)    3) C-spine 5/5, no increase in pain. (met)    4) Patient able to perform 10 minutes of scapular stabilization exercises with good posture and no cueing to correct.    5) Patient able to control s/s with there ex.     6) NDI score of <= 12%. (met)    7) I with final HEP.      Plan  Plan details: Wall push ups next.  Progress to prone scap stab as able.        Visit Diagnoses:    ICD-10-CM ICD-9-CM   1. Cervicalgia  M54.2 723.1       Progress per Plan of Care and Progress strengthening /stabilization /functional activity           Timed:  Manual Therapy:   10      mins  33205;  Therapeutic Exercise:    35     mins  09094;     Neuromuscular Bill:        mins  63669;    Therapeutic Activity:          mins  57916;     Gait Training:           mins  44537;     Ultrasound:          mins  10346;    Electrical Stimulation:         mins  97314 ( );    Untimed:  Electrical Stimulation:         mins  39437 ( );  Mechanical Traction:         mins  15552;     Timed Treatment:   45   mins   Total Treatment:     45   mins  Chey Jewell PTA  Physical Therapist

## 2020-11-11 ENCOUNTER — TELEPHONE (OUTPATIENT)
Dept: FAMILY MEDICINE CLINIC | Facility: CLINIC | Age: 40
End: 2020-11-11

## 2020-11-11 NOTE — TELEPHONE ENCOUNTER
----- Message from DELPHINE Flores sent at 11/3/2020  3:56 PM CST -----  LDL cholesterol remains slightly elevated.  I would recommend a low dose statin if she is not opposed to this.  This will help to decrease her risk of heart attack and stroke.  Vitamin D does continue to be low.  I did send in the once weekly vitamin D supplement for her.  A1C is borderline elevated at 5.7.  Recommend decreasing the amount of sugary and fatty foods in her diet.  This will help to correct the A1C as well as the LDL.

## 2020-11-18 DIAGNOSIS — Z12.31 ENCOUNTER FOR SCREENING MAMMOGRAM FOR BREAST CANCER: ICD-10-CM

## 2020-11-19 NOTE — PROGRESS NOTES
Subjective   Sara Edwards is a 39 y.o. female.     She presents today for routine wellness exam.  Her blood pressure is normal today in the office.  She continues to struggle with obesity.  Her BMI is currently 38.5%.  She has lost 4 pounds since her last office visit.  She is due for her routine fasting labs.  She is also due for routine screening mammogram.  She is without any new complaints today in the office.    Obesity  This is a chronic problem. The current episode started more than 1 year ago. The problem occurs constantly. The problem has been unchanged. Pertinent negatives include no abdominal pain, anorexia, change in bowel habit, chest pain, chills, congestion, coughing, diaphoresis, fatigue, fever, headaches, joint swelling, myalgias, nausea, neck pain, numbness, rash, sore throat, swollen glands, urinary symptoms, vertigo, visual change, vomiting or weakness. The symptoms are aggravated by drinking and eating. She has tried nothing for the symptoms. The treatment provided no relief.        The following portions of the patient's history were reviewed and updated as appropriate: allergies, current medications, past family history, past medical history, past social history, past surgical history and problem list.    Review of Systems   Constitutional: Negative.  Negative for chills, diaphoresis, fatigue and fever.   HENT: Negative.  Negative for congestion, sore throat and swollen glands.    Eyes: Negative.    Respiratory: Negative.  Negative for cough.    Cardiovascular: Negative.  Negative for chest pain.   Gastrointestinal: Negative.  Negative for abdominal pain, anorexia, change in bowel habit, nausea and vomiting.   Musculoskeletal: Negative.  Negative for joint swelling, myalgias and neck pain.   Skin: Negative.  Negative for rash.   Allergic/Immunologic: Negative.    Neurological: Negative.  Negative for vertigo, weakness and numbness.   Hematological: Negative.       Psychiatric/Behavioral: Negative.        Objective   Physical Exam  Vitals signs reviewed.   Constitutional:       General: She is not in acute distress.     Appearance: She is well-developed. She is obese. She is not diaphoretic.   HENT:      Head: Normocephalic.      Right Ear: External ear normal.      Left Ear: External ear normal.      Nose: Nose normal.   Eyes:      Pupils: Pupils are equal, round, and reactive to light.   Neck:      Musculoskeletal: Normal range of motion and neck supple.      Thyroid: No thyromegaly.      Vascular: No JVD.   Cardiovascular:      Rate and Rhythm: Normal rate and regular rhythm.      Heart sounds: No murmur. No friction rub. No gallop.    Pulmonary:      Effort: Pulmonary effort is normal. No respiratory distress.      Breath sounds: Normal breath sounds. No wheezing or rales.   Musculoskeletal: Normal range of motion.   Skin:     General: Skin is warm and dry.      Coloration: Skin is not pale.      Findings: No erythema or rash.   Neurological:      Mental Status: She is alert and oriented to person, place, and time.   Psychiatric:         Behavior: Behavior normal.         Thought Content: Thought content normal.         Judgment: Judgment normal.           Assessment/Plan   Diagnoses and all orders for this visit:    1. Well adult exam (Primary)  -     CBC & Differential  -     Comprehensive Metabolic Panel  -     Hemoglobin A1c  -     Lipid Panel  -     TSH  -     Vitamin D 25 Hydroxy  -     Hepatitis C Antibody  -     CBC Auto Differential    2. Chronic migraine  -     CBC & Differential  -     Comprehensive Metabolic Panel  -     Hemoglobin A1c  -     Lipid Panel  -     TSH  -     Vitamin D 25 Hydroxy  -     Hepatitis C Antibody  -     CBC Auto Differential    3. Benign essential HTN  -     CBC & Differential  -     Comprehensive Metabolic Panel  -     Hemoglobin A1c  -     Lipid Panel  -     TSH  -     Vitamin D 25 Hydroxy  -     Hepatitis C Antibody  -     CBC Auto  Differential    4. Mixed hyperlipidemia  -     CBC & Differential  -     Comprehensive Metabolic Panel  -     Hemoglobin A1c  -     Lipid Panel  -     TSH  -     Vitamin D 25 Hydroxy  -     Hepatitis C Antibody  -     CBC Auto Differential    5. Screening for diabetes mellitus  -     CBC & Differential  -     Comprehensive Metabolic Panel  -     Hemoglobin A1c  -     Lipid Panel  -     TSH  -     Vitamin D 25 Hydroxy  -     Hepatitis C Antibody  -     CBC Auto Differential    6. Screening for lipoid disorders  -     CBC & Differential  -     Comprehensive Metabolic Panel  -     Hemoglobin A1c  -     Lipid Panel  -     TSH  -     Vitamin D 25 Hydroxy  -     Hepatitis C Antibody  -     CBC Auto Differential    7. Screening for thyroid disorder  -     CBC & Differential  -     Comprehensive Metabolic Panel  -     Hemoglobin A1c  -     Lipid Panel  -     TSH  -     Vitamin D 25 Hydroxy  -     Hepatitis C Antibody  -     CBC Auto Differential    8. Encounter for hepatitis C screening test for low risk patient  -     CBC & Differential  -     Comprehensive Metabolic Panel  -     Hemoglobin A1c  -     Lipid Panel  -     TSH  -     Vitamin D 25 Hydroxy  -     Hepatitis C Antibody  -     CBC Auto Differential    9. Encounter for screening mammogram for breast cancer  -     Mammo Screening Bilateral With CAD; Future               Patient's Body mass index is 38.48 kg/m². BMI is above normal parameters. Recommendations include: educational material.    Fasting labs.  Schedule for screening mammogram.  Continue current medications.  Follow up in 1 year for annual wellness visit.  Follow up sooner for problems/concerns.  Patient verbalized understanding and agreement with plan of care.        This document has been electronically signed by DELPHINE Flores on November 18, 2020 20:50 CST

## 2021-01-08 ENCOUNTER — OFFICE VISIT (OUTPATIENT)
Dept: FAMILY MEDICINE CLINIC | Facility: CLINIC | Age: 41
End: 2021-01-08

## 2021-01-08 VITALS
HEIGHT: 66 IN | OXYGEN SATURATION: 99 % | TEMPERATURE: 96.9 F | DIASTOLIC BLOOD PRESSURE: 84 MMHG | RESPIRATION RATE: 22 BRPM | BODY MASS INDEX: 37.93 KG/M2 | HEART RATE: 85 BPM | SYSTOLIC BLOOD PRESSURE: 122 MMHG | WEIGHT: 236 LBS

## 2021-01-08 DIAGNOSIS — M54.31 RIGHT SIDED SCIATICA: Primary | ICD-10-CM

## 2021-01-08 PROCEDURE — 99213 OFFICE O/P EST LOW 20 MIN: CPT | Performed by: NURSE PRACTITIONER

## 2021-01-08 PROCEDURE — 96372 THER/PROPH/DIAG INJ SC/IM: CPT | Performed by: NURSE PRACTITIONER

## 2021-01-08 RX ORDER — KETOROLAC TROMETHAMINE 10 MG/1
10 TABLET, FILM COATED ORAL EVERY 6 HOURS PRN
Qty: 20 TABLET | Refills: 0 | Status: SHIPPED | OUTPATIENT
Start: 2021-01-08 | End: 2021-04-21

## 2021-01-08 RX ORDER — METHYLPREDNISOLONE 4 MG/1
TABLET ORAL
Qty: 1 EACH | Refills: 0 | Status: SHIPPED | OUTPATIENT
Start: 2021-01-08 | End: 2021-04-21

## 2021-01-08 RX ORDER — KETOROLAC TROMETHAMINE 30 MG/ML
60 INJECTION, SOLUTION INTRAMUSCULAR; INTRAVENOUS ONCE
Status: COMPLETED | OUTPATIENT
Start: 2021-01-08 | End: 2021-01-08

## 2021-01-08 RX ADMIN — KETOROLAC TROMETHAMINE 60 MG: 30 INJECTION, SOLUTION INTRAMUSCULAR; INTRAVENOUS at 12:31
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
## 2021-04-21 ENCOUNTER — OFFICE VISIT (OUTPATIENT)
Dept: FAMILY MEDICINE CLINIC | Facility: CLINIC | Age: 41
End: 2021-04-21

## 2021-04-21 VITALS
HEIGHT: 66 IN | HEART RATE: 68 BPM | OXYGEN SATURATION: 99 % | BODY MASS INDEX: 37.28 KG/M2 | TEMPERATURE: 97.6 F | RESPIRATION RATE: 22 BRPM | DIASTOLIC BLOOD PRESSURE: 78 MMHG | WEIGHT: 232 LBS | SYSTOLIC BLOOD PRESSURE: 120 MMHG

## 2021-04-21 DIAGNOSIS — R42 DIZZINESS: ICD-10-CM

## 2021-04-21 DIAGNOSIS — B07.9 VIRAL WART ON FINGER: ICD-10-CM

## 2021-04-21 DIAGNOSIS — R09.81 NASAL CONGESTION: ICD-10-CM

## 2021-04-21 DIAGNOSIS — H69.83 EUSTACHIAN TUBE DYSFUNCTION, BILATERAL: ICD-10-CM

## 2021-04-21 DIAGNOSIS — H66.92 LEFT OTITIS MEDIA, UNSPECIFIED OTITIS MEDIA TYPE: Primary | ICD-10-CM

## 2021-04-21 PROCEDURE — 99214 OFFICE O/P EST MOD 30 MIN: CPT | Performed by: NURSE PRACTITIONER

## 2021-04-21 RX ORDER — AZITHROMYCIN 250 MG/1
TABLET, FILM COATED ORAL
Qty: 6 TABLET | Refills: 0 | Status: SHIPPED | OUTPATIENT
Start: 2021-04-21 | End: 2021-05-04

## 2021-04-21 RX ORDER — TRIAMCINOLONE ACETONIDE 55 UG/1
2 SPRAY, METERED NASAL DAILY
Qty: 16.5 G | Refills: 11 | Status: SHIPPED | OUTPATIENT
Start: 2021-04-21 | End: 2022-04-21

## 2021-04-21 RX ORDER — MECLIZINE HYDROCHLORIDE 25 MG/1
TABLET ORAL
Qty: 30 TABLET | Refills: 0 | Status: SHIPPED | OUTPATIENT
Start: 2021-04-21

## 2021-04-21 RX ORDER — CHOLECALCIFEROL (VITAMIN D3) 125 MCG
CAPSULE ORAL
COMMUNITY
End: 2021-04-21

## 2021-04-21 NOTE — PATIENT INSTRUCTIONS
Otitis Media, Adult    Otitis media occurs when there is inflammation and fluid in the middle ear space with signs and symptoms of an acute infection. The middle ear is a part of the ear that contains bones for hearing as well as air that helps send sounds to the brain. When infected fluid builds up in this space, it causes pressure and results in symptoms of acute otitis media. The eustachian tube connects the middle ear to the back of the nose (nasopharynx) and normally allows air into the middle ear space. If the eustachian tube becomes blocked, fluid can build up and become infected.  What are the causes?  This condition is caused by a blockage in the eustachian tube. This can be caused by an object like mucus, or by swelling (edema) of the tube. Problems that can cause a blockage include:  · A cold or other upper respiratory infection.  · Allergies.  · An irritant, such as tobacco smoke.  · Enlarged adenoids. The adenoids are areas of soft tissue located high in the back of the throat, behind the nose and the roof of the mouth. They are part of the body's defense system (immune system).  · A mass in the nasopharynx.  · Damage to the ear caused by pressure changes (barotrauma).  What are the signs or symptoms?  Symptoms of this condition include:  · Ear pain.  · Fever.  · Decreased hearing.  · Tiredness (lethargy).  · Fluid leaking from the ear, if the eardrum is ruptured or has burst.  · Ringing in the ear.  How is this diagnosed?    This condition is diagnosed with a physical exam. During the exam, your health care provider will use an instrument called an otoscope to look in your ear and check for redness, swelling, and fluid. He or she will also ask about your symptoms.  Your health care provider may also order tests, such as:  · A pneumatic otoscopy. This is a test to check the movement of the eardrum. It is done by squeezing a small amount of air into the ear.  · A tympanogram is a test that shows how well  the eardrum moves in response to air pressure in the ear canal. It provides a graph for your health care provider to review.  How is this treated?  This condition can go away on its own within 3-5 days. But if the condition is caused by a bacterial infection and does not go away on its own, or if it keeps coming back, your health care provider may:  · Prescribe antibiotic medicine to treat the infection.  · Prescribe or recommend medicines to control pain.  Follow these instructions at home:  · Take over-the-counter and prescription medicines only as told by your health care provider.  · If you were prescribed an antibiotic medicine, take it as told by your health care provider. Do not stop taking the antibiotic even if you start to feel better.  · Keep all follow-up visits as told by your health care provider. This is important.  Contact a health care provider if:  · You have bleeding from your nose.  · There is a lump on your neck.  · You are not feeling better in 5 days.  · You feel worse instead of better.  Get help right away if:  · You have severe pain that is not controlled with medicine.  · You have swelling, redness, or pain around your ear.  · You have stiffness in your neck.  · A part of your face is not moving (paralyzed).  · The bone behind your ear (mastoid) is tender when you touch it.  · You develop a severe headache.  Summary  · Otitis media is redness, soreness, and swelling of the middle ear, usually resulting in pain.  · This condition can go away on its own within 3-5 days.  · If the problem does not go away in 3-5 days, your health care provider may prescribe or recommend medicines to treat the infection or your symptoms.  · If you were prescribed an antibiotic medicine, take it as told by your health care provider.  · Follow all instructions you were given by your health care provider.  This information is not intended to replace advice given to you by your health care provider. Make sure you  discuss any questions you have with your health care provider.  Document Revised: 11/19/2020 Document Reviewed: 11/19/2020  Elsevier Patient Education © 2021 [employer]      Warts    Warts are small growths on the skin. They are common, and they are caused by a virus. Warts can be found on many parts of the body. A person may have one wart or many warts. Most warts will go away on their own with time, but this could take many months to a few years. Treatments may be done if needed.  What are the causes?  Warts are caused by a type of virus that is called HPV.  · This virus can spread from person to person through touching.  · Warts can also spread to other parts of the body when a person scratches a wart and then scratches normal skin.  What increases the risk?  You are more likely to get warts if:  · You are 10-20 years old.  · You have a weak body defense system (immune system).  · You are .  What are the signs or symptoms?  The main symptom of this condition is small growths on the skin. Warts may:  · Be round, oval, or have an uneven shape.  · Feel rough to the touch.  · Be the color of your skin or light yellow, brown, or gray.  · Often be less than ½ inch (1.3 cm) in size.  · Go away and then come back again.  Most warts do not hurt, but some can hurt if they are large or if they are on the bottom of your feet.  How is this diagnosed?  A wart can often be diagnosed by how it looks. In some cases, the doctor might remove a little bit of the wart to test it (biopsy).  How is this treated?  Most of the time, warts do not need treatment. Sometimes people want warts removed. If treatment is needed or wanted, options may include:  · Putting creams or patches with medicine in them on the wart.  · Putting duct tape over the top of the wart.  · Freezing the wart.  · Burning the wart with:  ? A laser.  ? An electric probe.  · Giving a shot of medicine into the wart to help the body's defense system fight off  the wart.  · Surgery to remove the wart.  Follow these instructions at home:    Medicines  · Apply over-the-counter and prescription medicines only as told by your doctor.  · Do not apply over-the-counter wart medicines to your face or genitals before you ask your doctor if it is okay to do that.  Lifestyle  · Keep your body's defense system healthy. To do this:  ? Eat a healthy diet.  ? Get enough sleep.  ? Do not use any products that contain nicotine or tobacco, such as cigarettes and e-cigarettes. If you need help quitting, ask your doctor.  General instructions  · Wash your hands after you touch a wart.  · Do not scratch or pick at a wart.  · Avoid shaving hair that is over a wart.  · Keep all follow-up visits as told by your doctor. This is important.  Contact a doctor if:  · Your warts do not get better after treatment.  · You have redness, swelling, or pain at the site of a wart.  · You have bleeding from a wart, and the bleeding does not stop when you put light pressure on the wart.  · You have diabetes and you get a wart.  Summary  · Warts are small growths on the skin. They are common, and they are caused by a virus.  · Most of the time, warts do not need treatment. Sometimes people want warts removed. If treatment is needed or wanted, there are many options.  · Apply over-the-counter and prescription medicines only as told by your doctor.  · Wash your hands after you touch a wart.  · Keep all follow-up visits as told by your doctor. This is important.  This information is not intended to replace advice given to you by your health care provider. Make sure you discuss any questions you have with your health care provider.  Document Revised: 05/07/2019 Document Reviewed: 05/07/2019  Elsevier Patient Education © 2021 [employer] Inc[employer]  Eustachian Tube Dysfunction    Eustachian tube dysfunction refers to a condition in which a blockage develops in the narrow passage that connects the middle ear to the back of the  nose (eustachian tube). The eustachian tube regulates air pressure in the middle ear by letting air move between the ear and nose. It also helps to drain fluid from the middle ear space.  Eustachian tube dysfunction can affect one or both ears. When the eustachian tube does not function properly, air pressure, fluid, or both can build up in the middle ear.  What are the causes?  This condition occurs when the eustachian tube becomes blocked or cannot open normally. Common causes of this condition include:  · Ear infections.  · Colds and other infections that affect the nose, mouth, and throat (upper respiratory tract).  · Allergies.  · Irritation from cigarette smoke.  · Irritation from stomach acid coming up into the esophagus (gastroesophageal reflux). The esophagus is the tube that carries food from the mouth to the stomach.  · Sudden changes in air pressure, such as from descending in an airplane or scuba diving.  · Abnormal growths in the nose or throat, such as:  ? Growths that line the nose (nasal polyps).  ? Abnormal growth of cells (tumors).  ? Enlarged tissue at the back of the throat (adenoids).  What increases the risk?  You are more likely to develop this condition if:  · You smoke.  · You are overweight.  · You are a child who has:  ? Certain birth defects of the mouth, such as cleft palate.  ? Large tonsils or adenoids.  What are the signs or symptoms?  Common symptoms of this condition include:  · A feeling of fullness in the ear.  · Ear pain.  · Clicking or popping noises in the ear.  · Ringing in the ear.  · Hearing loss.  · Loss of balance.  · Dizziness.  Symptoms may get worse when the air pressure around you changes, such as when you travel to an area of high elevation, fly on an airplane, or go scuba diving.  How is this diagnosed?  This condition may be diagnosed based on:  · Your symptoms.  · A physical exam of your ears, nose, and throat.  · Tests, such as those that measure:  ? The movement  "of your eardrum (tympanogram).  ? Your hearing (audiometry).  How is this treated?  Treatment depends on the cause and severity of your condition.  · In mild cases, you may relieve your symptoms by moving air into your ears. This is called \"popping the ears.\"  · In more severe cases, or if you have symptoms of fluid in your ears, treatment may include:  ? Medicines to relieve congestion (decongestants).  ? Medicines that treat allergies (antihistamines).  ? Nasal sprays or ear drops that contain medicines that reduce swelling (steroids).  ? A procedure to drain the fluid in your eardrum (myringotomy). In this procedure, a small tube is placed in the eardrum to:  § Drain the fluid.  § Restore the air in the middle ear space.  ? A procedure to insert a balloon device through the nose to inflate the opening of the eustachian tube (balloon dilation).  Follow these instructions at home:  Lifestyle  · Do not do any of the following until your health care provider approves:  ? Travel to high altitudes.  ? Fly in airplanes.  ? Work in a pressurized cabin or room.  ? Scuba dive.  · Do not use any products that contain nicotine or tobacco, such as cigarettes and e-cigarettes. If you need help quitting, ask your health care provider.  · Keep your ears dry. Wear fitted earplugs during showering and bathing. Dry your ears completely after.  General instructions  · Take over-the-counter and prescription medicines only as told by your health care provider.  · Use techniques to help pop your ears as recommended by your health care provider. These may include:  ? Chewing gum.  ? Yawning.  ? Frequent, forceful swallowing.  ? Closing your mouth, holding your nose closed, and gently blowing as if you are trying to blow air out of your nose.  · Keep all follow-up visits as told by your health care provider. This is important.  Contact a health care provider if:  · Your symptoms do not go away after treatment.  · Your symptoms come back " after treatment.  · You are unable to pop your ears.  · You have:  ? A fever.  ? Pain in your ear.  ? Pain in your head or neck.  ? Fluid draining from your ear.  · Your hearing suddenly changes.  · You become very dizzy.  · You lose your balance.  Summary  · Eustachian tube dysfunction refers to a condition in which a blockage develops in the eustachian tube.  · It can be caused by ear infections, allergies, inhaled irritants, or abnormal growths in the nose or throat.  · Symptoms include ear pain, hearing loss, or ringing in the ears.  · Mild cases are treated with maneuvers to unblock the ears, such as yawning or ear popping.  · Severe cases are treated with medicines. Surgery may also be done (rare).  This information is not intended to replace advice given to you by your health care provider. Make sure you discuss any questions you have with your health care provider.  Document Revised: 04/09/2019 Document Reviewed: 04/09/2019  [employer] Patient Education © 2021 [employer]

## 2021-04-21 NOTE — PROGRESS NOTES
"Chief Complaint  Balance problems    Subjective          Sara Edwards presents to [facility]    [facility]    PCP: DELPHINE Kahn    CC: \"feels like a helicopter in my ears--balance is off, dizzy and nausea.  Also pointer finger on left hand has a spot that won't go away\"    Has noted spot on left 2nd finger for months.  Treated at home with cryotherapy and topical salicylic acid with no change in lesion.  Denies bleeding. Is bothersome and would like to see about having removed.     C/O dizziness, fullness/popping in ears and roaring x 2-3 days.  Did have runny nose/congestion this AM, but otherwise has not felt ill.  Not currently taking any allergy meds.  Hx of multiple ear infections/tubes as a child.          Dizziness  This is a new problem. Episode onset: x 2-3 days. The problem occurs daily. The problem has been gradually worsening. Associated symptoms include congestion and nausea. Pertinent negatives include no abdominal pain, anorexia, arthralgias, change in bowel habit, chest pain, chills, coughing, diaphoresis, fatigue, fever, headaches, joint swelling, myalgias, neck pain, numbness, rash, sore throat, swollen glands, urinary symptoms, vertigo, visual change, vomiting or weakness. The symptoms are aggravated by walking. She has tried nothing for the symptoms. The treatment provided no relief.       Review of Systems   Constitutional: Negative for appetite change, chills, diaphoresis, fatigue and fever.   HENT: Positive for congestion and rhinorrhea (clear). Negative for ear discharge, sinus pressure, sinus pain, sneezing, sore throat and trouble swallowing. Ear pain: no pain/fullness/popping.    Eyes: Negative.    Respiratory: Negative for cough, chest tightness, shortness of breath and wheezing.    Cardiovascular: Negative for chest pain.   Gastrointestinal: Positive for nausea. Negative for abdominal pain, anorexia, change in bowel habit and " "vomiting.   Genitourinary: Negative for difficulty urinating.   Musculoskeletal: Negative for arthralgias, joint swelling, myalgias and neck pain.   Skin: Negative for rash.   Neurological: Positive for dizziness. Negative for vertigo, weakness, numbness and headaches.        Objective   Vital Signs:   /78 (BP Location: Right arm, Patient Position: Sitting, Cuff Size: Large Adult)   Pulse 68   Temp 97.6 °F (36.4 °C)   Resp 22   Ht 167.6 cm (66\")   Wt 105 kg (232 lb)   SpO2 99%   BMI 37.45 kg/m²       Physical Exam  Vitals and nursing note reviewed.   Constitutional:       General: She is not in acute distress.     Appearance: Normal appearance. She is not ill-appearing.   HENT:      Head: Normocephalic and atraumatic.      Right Ear: A middle ear effusion is present. Tympanic membrane is scarred. Tympanic membrane is not injected.      Left Ear: A middle ear effusion is present. Tympanic membrane is injected and scarred.      Nose: Congestion present.      Right Sinus: No maxillary sinus tenderness or frontal sinus tenderness.      Left Sinus: No maxillary sinus tenderness or frontal sinus tenderness.      Mouth/Throat:      Pharynx: Posterior oropharyngeal erythema ( mild) present. No pharyngeal swelling or oropharyngeal exudate.   Eyes:      General:         Right eye: No discharge.         Left eye: No discharge.      Extraocular Movements: Extraocular movements intact.      Conjunctiva/sclera: Conjunctivae normal.      Pupils: Pupils are equal, round, and reactive to light.   Cardiovascular:      Rate and Rhythm: Normal rate and regular rhythm.   Pulmonary:      Effort: Pulmonary effort is normal. No respiratory distress.      Breath sounds: Normal breath sounds. No wheezing, rhonchi or rales.   Musculoskeletal:      Cervical back: Neck supple. No tenderness.   Lymphadenopathy:      Cervical: No cervical adenopathy.   Skin:     General: Skin is warm and dry.      Findings: Lesion present.          "   Neurological:      General: No focal deficit present.      Mental Status: She is alert and oriented to person, place, and time.          Result Review :                 Assessment and Plan    Diagnoses and all orders for this visit:    1. Left otitis media, unspecified otitis media type (Primary)  -     azithromycin (Zithromax Z-Jeramie) 250 MG tablet; Take 2 tablets by mouth on day 1, then 1 tablet daily on days 2-5  Dispense: 6 tablet; Refill: 0    2. Nasal congestion  -     Triamcinolone Acetonide (NASACORT) 55 MCG/ACT nasal inhaler; 2 sprays into the nostril(s) as directed by provider Daily.  Dispense: 16.5 g; Refill: 11    3. Dizziness  -     meclizine (ANTIVERT) 25 MG tablet; 1/2 to 1 tab po TID prn dizziness  Dispense: 30 tablet; Refill: 0    4. Eustachian tube dysfunction, bilateral  -     Triamcinolone Acetonide (NASACORT) 55 MCG/ACT nasal inhaler; 2 sprays into the nostril(s) as directed by provider Daily.  Dispense: 16.5 g; Refill: 11    5. Viral wart on finger  -     Ambulatory Referral to Dermatology      Rx for Zithromax, Nascort for OM, nasal congestion  Rx for Antivert as needed for dizziness/nausea  Avoid stairs, ladders, operating car/machinery when dizzy.  Change positions slowly.     Declines cryotherapy in office today as it has not been beneficial at home.  Referral to dermatology placed.     Patient's Body mass index is 37.45 kg/m². BMI is above normal parameters. Recommendations include: referral to primary care.    See PCP or RTC if symptoms persist/worsen  See PCP for routine f/u visit and management of chronic medical conditions      This document has been electronically signed by DELPHINE Keith on April 21, 2021 18:07 CDT,.

## 2021-04-22 ENCOUNTER — DOCUMENTATION (OUTPATIENT)
Dept: FAMILY MEDICINE CLINIC | Facility: CLINIC | Age: 41
End: 2021-04-22

## 2021-05-04 ENCOUNTER — OFFICE VISIT (OUTPATIENT)
Dept: FAMILY MEDICINE CLINIC | Facility: CLINIC | Age: 41
End: 2021-05-04

## 2021-05-04 VITALS
RESPIRATION RATE: 20 BRPM | HEART RATE: 72 BPM | DIASTOLIC BLOOD PRESSURE: 84 MMHG | HEIGHT: 66 IN | TEMPERATURE: 98.7 F | WEIGHT: 230.6 LBS | BODY MASS INDEX: 37.06 KG/M2 | SYSTOLIC BLOOD PRESSURE: 124 MMHG | OXYGEN SATURATION: 99 %

## 2021-05-04 DIAGNOSIS — R05.9 COUGH: Primary | ICD-10-CM

## 2021-05-04 PROCEDURE — 87635 SARS-COV-2 COVID-19 AMP PRB: CPT | Performed by: NURSE PRACTITIONER

## 2021-05-04 PROCEDURE — 99214 OFFICE O/P EST MOD 30 MIN: CPT | Performed by: NURSE PRACTITIONER

## 2021-05-04 RX ORDER — METHYLPREDNISOLONE 4 MG/1
TABLET ORAL
Qty: 21 TABLET | Refills: 0 | Status: SHIPPED | OUTPATIENT
Start: 2021-05-04

## 2021-05-04 NOTE — PROGRESS NOTES
Chief Complaint  Nasal Congestion, Cough, Headache, and Earache    Subjective    History of Present Illness {CC  Problem List  Visit  Diagnosis   Encounters  Notes  Medications  Labs  Result Review Imaging  Media :23}     Sara Edwards presents to [facility] for   C/o ears not better from visit in walk in clinic on 4/21/21. Still having runny nose, cough in AM only. Worsening sx since 5/1/21 and daughter with same sx. Has finished zpack prescribed on 4/21/21 without much improvement. Reports popping of bilat ears with some dizziness. Does report hx of seasonal allergies and migraine HA - using OTC tylenol/ibuprofen without sx relief    Cough  This is a new problem. The current episode started in the past 7 days (started saturday). The problem has been unchanged. Episode frequency: in the mornings. The cough is non-productive (feels like mucus stuck in throat in AM). Associated symptoms include ear pain, headaches, postnasal drip and rhinorrhea. Pertinent negatives include no chest pain, fever, nasal congestion, sore throat, shortness of breath or wheezing. Nothing aggravates the symptoms. She has tried nothing for the symptoms. tubes as a child   Headache   This is a new problem. The current episode started in the past 7 days (started saturday). The problem occurs constantly. The problem has been unchanged. The pain is located in the bilateral region. The pain does not radiate. The pain quality is similar to prior headaches. The quality of the pain is described as throbbing. The pain is at a severity of 2/10. The pain is mild. Associated symptoms include coughing, ear pain and rhinorrhea. Pertinent negatives include no blurred vision, eye pain, eye watering, fever, nausea, sinus pressure, sore throat or vomiting. The symptoms are aggravated by bright light. She has tried acetaminophen and NSAIDs for the symptoms. The treatment provided mild relief. Her past medical  "history is significant for migraine headaches. (Tubes as a child)   Earache   There is pain in both ears. This is a recurrent problem. The current episode started 1 to 4 weeks ago. The problem occurs constantly. The problem has been gradually improving. There has been no fever. Associated symptoms include coughing, headaches and rhinorrhea. Pertinent negatives include no ear discharge, sore throat or vomiting. She has tried acetaminophen, NSAIDs and antibiotics for the symptoms. The treatment provided mild (Pt reports prior to antibiotic she heard \"helicopter\" in her ears, she now reports that her ears pop intermittently and some dizziness) relief. Her past medical history is significant for a tympanostomy tube. tubes as a child        Objective     Physical Exam  Vitals and nursing note reviewed.   Constitutional:       General: She is not in acute distress.     Appearance: Normal appearance. She is obese. She is not ill-appearing.   HENT:      Head: Normocephalic and atraumatic.      Right Ear: Ear canal normal. No drainage or swelling. A middle ear effusion is present. No mastoid tenderness. Tympanic membrane is not erythematous.      Left Ear: Ear canal normal. No drainage or swelling. A middle ear effusion is present. No mastoid tenderness. Tympanic membrane is not erythematous.      Ears:        Comments: Notable scarring on TM from tubes     Nose: Nose normal.      Mouth/Throat:      Mouth: Mucous membranes are moist.      Pharynx: Oropharynx is clear.   Eyes:      Conjunctiva/sclera: Conjunctivae normal.      Pupils: Pupils are equal, round, and reactive to light.   Cardiovascular:      Rate and Rhythm: Normal rate and regular rhythm.      Heart sounds: Normal heart sounds.   Pulmonary:      Effort: Pulmonary effort is normal.      Breath sounds: Normal breath sounds. No wheezing or rhonchi.   Musculoskeletal:         General: Normal range of motion.      Cervical back: Normal range of motion.   Skin:     " "General: Skin is warm and dry.   Neurological:      General: No focal deficit present.      Mental Status: She is alert and oriented to person, place, and time.   Psychiatric:         Mood and Affect: Mood normal.         Behavior: Behavior normal.        Result Review  Data Reviewed:{ Labs  Result Review  Imaging  Med Tab  Media :23}          Vital Signs:   /84 (BP Location: Right arm, Patient Position: Sitting, Cuff Size: Adult)   Pulse 72   Temp 98.7 °F (37.1 °C) (Temporal)   Resp 20   Ht 167.6 cm (66\")   Wt 105 kg (230 lb 9.6 oz)   SpO2 99%   BMI 37.22 kg/m²          Assessment and Plan {CC Problem List  Visit Diagnosis  ROS  Review (Popup)  Health Maintenance  Quality  BestPractice  Medications  SmartSets  SnapShot Encounters  Media :23}   Problem List Items Addressed This Visit     None      Visit Diagnoses     Cough    -  Primary    Relevant Medications    methylPREDNISolone (MEDROL) 4 MG dose pack    Other Relevant Orders    COVID-19, [facility] IN-HOUSE, NP SWAB IN TRANSPORT MEDIA 8-10 HR TAT - Swab, Nasopharynx          - Pt reports some improvement with zpak given to her on 4/21/21  - Discussed Claritin use to help with runny nose and effusion along with continued use of Nasacort  - Discussed optional steroid to help with the inflammation and patient request oral medication instead of IM injection  - RX for Medrol dose pk sent to pharmacy  - Discussed importance of rest and hydration  - Discussed mucinex OTC could help with drainage in the mornings  - RTC PRN or f/u with PCP, Odalis Borja NP, as scheduled or if sx persist    Follow Up {Instructions Charge Capture  Follow-up Communications :23}   Return if symptoms worsen or fail to improve.  Patient was given instructions and counseling regarding her condition or for health maintenance advice. Please see specific information pulled into the AVS if appropriate            .  This document has been electronically signed by Landy JACOB " Noel, APRN on May 4, 2021 14:51 CDT

## 2021-05-05 LAB — SARS-COV-2 N GENE RESP QL NAA+PROBE: NOT DETECTED

## 2021-05-07 ENCOUNTER — TELEPHONE (OUTPATIENT)
Dept: FAMILY MEDICINE CLINIC | Facility: CLINIC | Age: 41
End: 2021-05-07

## 2021-05-07 NOTE — TELEPHONE ENCOUNTER
I called the pt to make sure that she had been contacted by the dermatology office about her appointment.  She stated that she had

## 2021-05-13 ENCOUNTER — TELEPHONE (OUTPATIENT)
Dept: FAMILY MEDICINE CLINIC | Facility: CLINIC | Age: 41
End: 2021-05-13

## 2021-05-13 NOTE — TELEPHONE ENCOUNTER
----- Message from Angela Narvaez MA sent at 5/7/2021  5:04 PM CDT -----  I spoke with the pt and she said that the dermatology office contacted her with her appointment for 6/8/2021 at 8:00

## 2024-12-06 NOTE — TELEPHONE ENCOUNTER
I returned pt call x 2, left voicemail for pt that Swapnil was out of the office and that she would be back tomorrow.   
Patient called stating she was seen in the walk-in clinic last month (12/4/19) for a rash and was told to call the office if it does not improve. As of today, patient states she still has the rash and it is still causing her discomfort. She also mentioned she has been taking the ointment that she was prescribed and it help the itching for a few moments, then the itchy feeling returns. Patient is needing to know what she should do at this time. Please call patient back and discuss at the earliest convenience. Call back number is [phone]  
Anterior right wound

## (undated) DEVICE — PK EXTRM LF 60

## (undated) DEVICE — KT BRST TISS EXPANDR CUSTOM FILL

## (undated) DEVICE — SUT VICRYL 4/0 SUTUPAK 18 J109T

## (undated) DEVICE — SYR 10ML

## (undated) DEVICE — Device

## (undated) DEVICE — 3M™ STERI-STRIP™ REINFORCED ADHESIVE SKIN CLOSURES, R1547, 1/2 IN X 4 IN (12 MM X 100 MM), 6 STRIPS/ENVELOPE: Brand: 3M™ STERI-STRIP™

## (undated) DEVICE — SUT VIC 3/0 TIES 18IN J110T

## (undated) DEVICE — SUT SILK 1 LBYRNTH TIES 30IN A307H

## (undated) DEVICE — CATH VASC RF CLOSUREFAST 7CM 7F60CM

## (undated) DEVICE — GLV SURG TRIUMPH LT PF LTX 7 STRL

## (undated) DEVICE — BNDG ELAS ELITE V/CLOSE 6IN 5YD LF STRL

## (undated) DEVICE — GOWN,AURORA,NOREINF,RAGLAN,XL,STERILE: Brand: MEDLINE

## (undated) DEVICE — 3M™ IOBAN™ 2 ANTIMICROBIAL INCISE DRAPE 6651EZ: Brand: IOBAN™ 2

## (undated) DEVICE — I-KNIFE CUTTING INSTRUMENT 15 DEGREE: Brand: I-KNIFE

## (undated) DEVICE — ST CVR PROB PULLUP ULTRASND 5X48IN

## (undated) DEVICE — DRAPE,SPLIT,BILATERAL,STERILE: Brand: MEDLINE

## (undated) DEVICE — GAUZE,SPONGE,4"X4",16PLY,XRAY,STRL,LF: Brand: MEDLINE

## (undated) DEVICE — GLV SURG SENSICARE POLYISPRN W/ALOE PF LF 6 GRN STRL

## (undated) DEVICE — STPLR SKIN VISISTAT WD 35CT

## (undated) DEVICE — ELECTRD BLD EZ CLN MOD 2.5IN

## (undated) DEVICE — STERILE POLYISOPRENE POWDER-FREE SURGICAL GLOVES WITH EMOLLIENT COATING: Brand: PROTEXIS

## (undated) DEVICE — GLW STD STR 3CM .025X150CM

## (undated) DEVICE — KT INTRO MINISTICK MAX W/GW PALLADIUM ECHO 4F 21G 7CM

## (undated) DEVICE — PAD,ABDOMINAL,8"X10",ST,LF: Brand: MEDLINE

## (undated) DEVICE — NDL SPINAL QUINCKE 22G 3IN BLK

## (undated) DEVICE — SUT VICRYL 0 TIES J112T

## (undated) DEVICE — STERILE POLYISOPRENE POWDER-FREE SURGICAL GLOVES: Brand: PROTEXIS

## (undated) DEVICE — BANDAGE,GAUZE,BULKEE II,4.5"X4.1YD,STRL: Brand: MEDLINE